# Patient Record
Sex: FEMALE | Race: BLACK OR AFRICAN AMERICAN | NOT HISPANIC OR LATINO | Employment: UNEMPLOYED | URBAN - METROPOLITAN AREA
[De-identification: names, ages, dates, MRNs, and addresses within clinical notes are randomized per-mention and may not be internally consistent; named-entity substitution may affect disease eponyms.]

---
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
## 2023-05-31 ENCOUNTER — APPOINTMENT (OUTPATIENT)
Dept: LAB | Facility: CLINIC | Age: 18
End: 2023-05-31
Payer: MEDICARE

## 2023-05-31 DIAGNOSIS — R76.12 NONSPECIFIC REACTION TO CELL MEDIATED IMMUNITY MEASUREMENT OF GAMMA INTERFERON ANTIGEN RESPONSE WITHOUT ACTIVE TUBERCULOSIS: ICD-10-CM

## 2023-05-31 PROCEDURE — 36415 COLL VENOUS BLD VENIPUNCTURE: CPT

## 2023-05-31 PROCEDURE — 86480 TB TEST CELL IMMUN MEASURE: CPT

## 2023-06-01 LAB
GAMMA INTERFERON BACKGROUND BLD IA-ACNC: 0.03 IU/ML
M TB IFN-G BLD-IMP: NEGATIVE
M TB IFN-G CD4+ BCKGRND COR BLD-ACNC: 0 IU/ML
M TB IFN-G CD4+ BCKGRND COR BLD-ACNC: 0 IU/ML
MITOGEN IGNF BCKGRD COR BLD-ACNC: >10 IU/ML

## 2023-06-21 ENCOUNTER — HOSPITAL ENCOUNTER (EMERGENCY)
Facility: HOSPITAL | Age: 18
Discharge: HOME | End: 2023-06-21
Attending: EMERGENCY MEDICINE

## 2023-06-21 VITALS
TEMPERATURE: 97.4 F | OXYGEN SATURATION: 100 % | RESPIRATION RATE: 18 BRPM | SYSTOLIC BLOOD PRESSURE: 124 MMHG | HEART RATE: 64 BPM | DIASTOLIC BLOOD PRESSURE: 73 MMHG

## 2023-06-21 DIAGNOSIS — J02.9 VIRAL PHARYNGITIS: ICD-10-CM

## 2023-06-21 DIAGNOSIS — R51.9 HEADACHE: Primary | ICD-10-CM

## 2023-06-21 PROCEDURE — 99283 EMERGENCY DEPT VISIT LOW MDM: CPT

## 2023-06-21 RX ORDER — IBUPROFEN 600 MG/1
600 TABLET ORAL ONCE
Status: COMPLETED | OUTPATIENT
Start: 2023-06-21 | End: 2023-06-21

## 2023-06-21 RX ORDER — ACETAMINOPHEN 325 MG/1
650 TABLET ORAL ONCE
Status: COMPLETED | OUTPATIENT
Start: 2023-06-21 | End: 2023-06-21

## 2023-06-21 RX ADMIN — DEXAMETHASONE SODIUM PHOSPHATE 10 MG: 10 INJECTION, SOLUTION INTRAMUSCULAR; INTRAVENOUS at 20:19

## 2023-06-21 RX ADMIN — ACETAMINOPHEN 650 MG: 325 TABLET ORAL at 20:20

## 2023-06-21 RX ADMIN — IBUPROFEN 600 MG: 600 TABLET ORAL at 20:20

## 2023-06-21 NOTE — ED ATTENDING ATTESTATION
Final Diagnoses:   No diagnosis found  Izzy Delgado MD, saw and evaluated the patient  All available labs and X-rays were ordered by me or the resident / non-physician and have been reviewed by myself  I discussed the patient with the resident / non-physician and agree with the resident's / non-physician practitioner's findings and plan as documented in the resident's / non-physician practicitioner's note, except where noted  At this point, I agree with the current assessment done in the ED  I was present during key portions of all procedures performed unless otherwise stated  Nursing Triage:     Chief Complaint   Patient presents with   • Headache     Pt reports a headache that worsens when she swallows, nasal congestion, and pressure in her ear when swallowing  HPI:   This is a 21 y o  female presenting for evaluation of headache   2 days ago she started to have throat pain with swallowing, nasal congestion, RIGHT ear pain and clogging since today  Eating/drinking okay  Hoarse voice today  +headache that is off/on  +cough (yellow/green sputum)  Took advil yesterday, and a cold flu medication this morning  She's been on her own since 13  No covid/flu vaccine  No sick contacts  No fevers, chills  She saw her Dad who had URI symptoms? ??  Doesn't see a dentist      PMH: Asthma    ASSESSMENT + PLAN:   Viral syndrome: Patient's story / exam fits with viral syndrome  - Discussed Tylenol/NSAIDs here + q6h at home  - Decadron for likely viral pharyngtis  - Discussed that it will have to run its course and low yield of antibiotics  - RTER precautions discussed orally  Physical:   General: VS reviewed  Appears in NAD  awake, alert  Well-nourished, well-developed  Appears stated age  Speaking normally in full sentences  Head: Normocephalic, atraumatic  Eyes: EOM-I  No diplopia  No hyphema  No subconjunctival hemorrhages  Symmetrical lids     ENT: Atraumatic external nose and ears  MMM  No Exudates/  No uvula deviation  No malocclusion  No stridor  Normal phonation  No drooling  Normal swallowing  Neck: No JVD  Right submandibualr tenderness  CV: No pallor noted  Lungs:   No tachypnea  No respiratory distress  Abd: soft nt nd no rebound/guarding  MSK:   FROM spontaneously  Skin: Dry, intact  Neuro: Awake, alert, GCS15, CN II-XII grossly intact  Motor grossly intact  Psychiatric/Behavioral: interacting normally; appropriate mood/affect   Exam: deferred    Vitals:    06/21/23 1906   BP: 124/73   BP Location: Right arm   Pulse: 64   Resp: 18   Temp: (!) 97 4 °F (36 3 °C)   TempSrc: Tympanic   SpO2: 100%     - There are no obvious limitations to social determinants of care  - Nursing note reviewed  - Vitals reviewed  - Orders placed by myself and/or advanced practitioner / resident     - Previous chart was reviewed  - No language barrier    - History obtained from patient  - There are no limitations to the history obtained:     Past Medical:    has a past medical history of Asthma and Heart murmur  Past Surgical:    has a past surgical history that includes Shoulder surgery (Right)  Social:     Social History     Substance and Sexual Activity   Alcohol Use Yes    Comment: socially     Social History     Tobacco Use   Smoking Status Every Day   • Packs/day: 0 25   • Types: Cigarettes   Smokeless Tobacco Never     Social History     Substance and Sexual Activity   Drug Use Yes   • Types: Marijuana    Comment: last used 09/10/22       Echo:   No results found for this or any previous visit  No results found for this or any previous visit  Cath:    No results found for this or any previous visit  Code Status: Prior  Advance Directive and Living Will:      Power of :    POLST:    Medications - No data to display  No orders to display     No orders of the defined types were placed in this encounter      Labs Reviewed - No data to "display  ED Disposition     None      Follow-up Information    None       Patient's Medications   Discharge Prescriptions    No medications on file     No discharge procedures on file  Prior to Admission Medications   Prescriptions Last Dose Informant Patient Reported? Taking? albuterol (PROVENTIL HFA,VENTOLIN HFA) 90 mcg/act inhaler   Yes No   Sig: Inhale 2 puffs every 6 (six) hours as needed for wheezing   pantoprazole (PROTONIX) 40 mg tablet   No No   Sig: Take 1 tablet (40 mg total) by mouth every 12 (twelve) hours   sucralfate (CARAFATE) 1 g tablet   No No   Sig: Take 1 tablet (1 g total) by mouth 4 (four) times a day (before meals and at bedtime)      Facility-Administered Medications: None                        Portions of the record may have been created with voice recognition software  Occasional wrong word or \"sound a like\" substitutions may have occurred due to the inherent limitations of voice recognition software  Read the chart carefully and recognize, using context, where substitutions have occurred      Electronically signed by:  Radha Naik    " "every 6 (six) hours as needed for wheezing   pantoprazole (PROTONIX) 40 mg tablet   No No   Sig: Take 1 tablet (40 mg total) by mouth every 12 (twelve) hours   sucralfate (CARAFATE) 1 g tablet   No No   Sig: Take 1 tablet (1 g total) by mouth 4 (four) times a day (before meals and at bedtime)      Facility-Administered Medications: None                        Portions of the record may have been created with voice recognition software  Occasional wrong word or \"sound a like\" substitutions may have occurred due to the inherent limitations of voice recognition software  Read the chart carefully and recognize, using context, where substitutions have occurred      Electronically signed by:  Debra Christianson    "

## 2023-06-21 NOTE — Clinical Note
Moreno Mendiola was seen and treated in our emergency department on 6/21/2023  Diagnosis:     Newton Cushing  may return to work on return date  She may return on this date: 06/24/2023         If you have any questions or concerns, please don't hesitate to call        Hellen Owen DO    ______________________________           _______________          _______________  Hospital Representative                              Date                                Time

## 2023-06-22 NOTE — ED PROVIDER NOTES
History  Chief Complaint   Patient presents with   • Headache     Pt reports a headache that worsens when she swallows, nasal congestion, and pressure in her ear when swallowing  HPI    77-year-old female presenting for evaluation of sore throat, congestion, right ear pain, cough, headache x2 days  Took Advil and cold and flu medication yesterday without relief of her symptoms  Symptoms are worsened when eating, swallowing  Not report difficulty handling secretions  No known sick contacts  No known fevers  Prior to Admission Medications   Prescriptions Last Dose Informant Patient Reported? Taking? albuterol (PROVENTIL HFA,VENTOLIN HFA) 90 mcg/act inhaler   Yes No   Sig: Inhale 2 puffs every 6 (six) hours as needed for wheezing   pantoprazole (PROTONIX) 40 mg tablet   No No   Sig: Take 1 tablet (40 mg total) by mouth every 12 (twelve) hours   sucralfate (CARAFATE) 1 g tablet   No No   Sig: Take 1 tablet (1 g total) by mouth 4 (four) times a day (before meals and at bedtime)      Facility-Administered Medications: None       Past Medical History:   Diagnosis Date   • Asthma    • Heart murmur        Past Surgical History:   Procedure Laterality Date   • SHOULDER SURGERY Right        Family History   Problem Relation Age of Onset   • Heart disease Mother      I have reviewed and agree with the history as documented  E-Cigarette/Vaping   • E-Cigarette Use Never User      E-Cigarette/Vaping Substances   • Nicotine No    • THC No    • CBD No    • Flavoring No    • Other No    • Unknown No      Social History     Tobacco Use   • Smoking status: Every Day     Packs/day: 0 25     Types: Cigarettes   • Smokeless tobacco: Never   Vaping Use   • Vaping Use: Never used   Substance Use Topics   • Alcohol use: Yes     Comment: socially   • Drug use: Yes     Types: Marijuana     Comment: last used 09/10/22        Review of Systems   Constitutional: Negative for chills and fever     HENT: Positive for congestion, ear pain, rhinorrhea and sore throat  Respiratory: Positive for cough  Negative for shortness of breath  Cardiovascular: Negative for chest pain, palpitations and leg swelling  Gastrointestinal: Negative for abdominal pain, diarrhea, nausea and vomiting  Genitourinary: Negative for dysuria and frequency  Musculoskeletal: Negative for myalgias  Skin: Negative for rash and wound  Neurological: Negative for dizziness, light-headedness and headaches  All other systems reviewed and are negative  Physical Exam  ED Triage Vitals [06/21/23 1906]   Temperature Pulse Respirations Blood Pressure SpO2   (!) 97 4 °F (36 3 °C) 64 18 124/73 100 %      Temp Source Heart Rate Source Patient Position - Orthostatic VS BP Location FiO2 (%)   Tympanic Monitor Sitting Right arm --      Pain Score       7             Orthostatic Vital Signs  Vitals:    06/21/23 1906   BP: 124/73   Pulse: 64   Patient Position - Orthostatic VS: Sitting       Physical Exam  Vitals and nursing note reviewed  Constitutional:       General: She is not in acute distress  Appearance: Normal appearance  She is not ill-appearing or toxic-appearing  HENT:      Head: Normocephalic and atraumatic  Right Ear: Tympanic membrane, ear canal and external ear normal       Left Ear: Tympanic membrane, ear canal and external ear normal       Nose: Nose normal       Mouth/Throat:      Mouth: Mucous membranes are moist       Pharynx: Oropharynx is clear  No oropharyngeal exudate or posterior oropharyngeal erythema  Eyes:      General: No scleral icterus  Extraocular Movements: Extraocular movements intact  Cardiovascular:      Rate and Rhythm: Normal rate and regular rhythm  Pulses: Normal pulses  Pulmonary:      Effort: Pulmonary effort is normal  No respiratory distress  Breath sounds: Normal breath sounds  Abdominal:      Palpations: Abdomen is soft  Tenderness: There is no abdominal tenderness     Musculoskeletal: General: Normal range of motion  Cervical back: Normal range of motion and neck supple  Skin:     General: Skin is warm  Capillary Refill: Capillary refill takes less than 2 seconds  Neurological:      General: No focal deficit present  Mental Status: She is alert and oriented to person, place, and time  ED Medications  Medications   acetaminophen (TYLENOL) tablet 650 mg (650 mg Oral Given 6/21/23 2020)   ibuprofen (MOTRIN) tablet 600 mg (600 mg Oral Given 6/21/23 2020)   dexamethasone oral liquid 10 mg 1 mL (10 mg Oral Given 6/21/23 2019)       Diagnostic Studies  Results Reviewed     None                 No orders to display         Procedures  Procedures      ED Course  ED Course as of 06/22/23 1905 Wed Jun 21, 2023 1958 2 days ago, sore throat, congestion, right ear pain  Worse with eating  Cough  SIMPSON  Took advil yesterday and cold/flu medication  No sick contacts  Medical Decision Making  64-year-old female presenting for evaluation of cough, sore throat, congestion, headache, ear pain  Differential diagnosis includes but is not limited to allergies, upper respiratory infection, viral pharyngitis  His exam is reassuring, she is overall well-appearing  Do not suspect PTA, RPA  Plan: Symptomatic treatment with ibuprofen, Tylenol, Decadron  Patient was given anticipatory guidance, advised to follow-up with her primary care physician, given strict return to ED precautions  All questions were answered at this time  I reviewed all testing with the patient: n/a  I gave oral return precautions for what to return for in addition to the written return precautions  The patient (and any family present: n/a) verbalized understanding of the discharge instructions and warnings that would necessitate return to the Emergency Department    I specifically highlighted areas of special concern regarding the written and verbal discharge instructions and return precautions  All questions were answered prior to discharge  Risk  OTC drugs  Prescription drug management  Disposition  Final diagnoses:   Headache   Viral pharyngitis     Time reflects when diagnosis was documented in both MDM as applicable and the Disposition within this note     Time User Action Codes Description Comment    6/21/2023  8:31 PM Jean Pierre Abbot [R51 9] Headache     6/21/2023  8:31 PM Robert Sheikh Mk [J02 9] Viral pharyngitis       ED Disposition     ED Disposition   Discharge    Condition   Stable    Date/Time   Wed Jun 21, 2023  8:31 PM    Comment   Trevor Garcia discharge to home/self care  Follow-up Information     Follow up With Specialties Details Why Contact Info Additional Information    Nancy Amaro   For Emergency Department Follow-up 12 [facility] [address] [address] [facility] Emergency Department Emergency Medicine  If symptoms worsen Daniele 10 [id number] 562 [facility] 64 [facility] Emergency Department, [address], [address]          Discharge Medication List as of 6/21/2023  8:32 PM      CONTINUE these medications which have NOT CHANGED    Details   albuterol (PROVENTIL HFA,VENTOLIN HFA) 90 mcg/act inhaler Inhale 2 puffs every 6 (six) hours as needed for wheezing, Historical Med      pantoprazole (PROTONIX) 40 mg tablet Take 1 tablet (40 mg total) by mouth every 12 (twelve) hours, Starting Wed 9/14/2022, Normal      sucralfate (CARAFATE) 1 g tablet Take 1 tablet (1 g total) by mouth 4 (four) times a day (before meals and at bedtime), Starting Wed 9/14/2022, Normal           No discharge procedures on file  PDMP Review     None           ED Provider  Attending physically available and evaluated Trevor Garcia  LIV managed the patient along with the ED Attending      Electronically Signed by         Christiana Daley,   06/22/23 1905

## 2023-07-19 ENCOUNTER — HOSPITAL ENCOUNTER (EMERGENCY)
Facility: HOSPITAL | Age: 18
Discharge: HOME | End: 2023-07-19
Attending: EMERGENCY MEDICINE | Admitting: EMERGENCY MEDICINE
Payer: COMMERCIAL

## 2023-07-19 VITALS
DIASTOLIC BLOOD PRESSURE: 74 MMHG | SYSTOLIC BLOOD PRESSURE: 105 MMHG | OXYGEN SATURATION: 100 % | HEART RATE: 62 BPM | TEMPERATURE: 97.5 F | RESPIRATION RATE: 16 BRPM

## 2023-07-19 DIAGNOSIS — Z32.02 PREGNANCY EXAMINATION OR TEST, NEGATIVE RESULT: ICD-10-CM

## 2023-07-19 DIAGNOSIS — N92.6 IRREGULAR MENSES: Primary | ICD-10-CM

## 2023-07-19 LAB
B-HCG SERPL-ACNC: <1 MIU/ML (ref 0–5)
BACTERIA UR QL AUTO: ABNORMAL /HPF
BILIRUB UR QL STRIP: NEGATIVE
CLARITY UR: CLEAR
COLOR UR: YELLOW
GLUCOSE UR STRIP-MCNC: NEGATIVE MG/DL
HGB UR QL STRIP.AUTO: NEGATIVE
KETONES UR STRIP-MCNC: NEGATIVE MG/DL
LEUKOCYTE ESTERASE UR QL STRIP: ABNORMAL
NITRITE UR QL STRIP: NEGATIVE
NON-SQ EPI CELLS URNS QL MICRO: ABNORMAL /HPF
PH UR STRIP.AUTO: 7 [PH] (ref 4.5–8)
PROT UR STRIP-MCNC: NEGATIVE MG/DL
RBC #/AREA URNS AUTO: ABNORMAL /HPF
SP GR UR STRIP.AUTO: 1.02 (ref 1–1.03)
UROBILINOGEN UR QL STRIP.AUTO: 0.2 E.U./DL
WBC #/AREA URNS AUTO: ABNORMAL /HPF

## 2023-07-19 PROCEDURE — 36415 COLL VENOUS BLD VENIPUNCTURE: CPT

## 2023-07-19 PROCEDURE — 84702 CHORIONIC GONADOTROPIN TEST: CPT

## 2023-07-19 PROCEDURE — 81001 URINALYSIS AUTO W/SCOPE: CPT

## 2023-07-19 NOTE — ED ATTENDING ATTESTATION
7/19/2023  I, Latoya Means MD, saw and evaluated the patient. I have discussed the patient with the resident/non-physician practitioner and agree with the resident's/non-physician practitioner's findings, Plan of Care, and MDM as documented in the resident's/non-physician practitioner's note, except where noted. All available labs and Radiology studies were reviewed. I was present for key portions of any procedure(s) performed by the resident/non-physician practitioner and I was immediately available to provide assistance. At this point I agree with the current assessment done in the Emergency Department. I have conducted an independent evaluation of this patient a history and physical is as follows:    ED Course         Critical Care Time  Procedures    18 yo female with irregular menses, lmp 6/15, no vaginal bleeding currently. Pt with faint positive urine preg at home yesterday, negative today. No abdominal pain. Vss, afebrile, lungs cta, rrr, abdomen soft nontender. Serum hcg.

## 2023-07-19 NOTE — ED PROVIDER NOTES
History  Chief Complaint   Patient presents with   • Pregnancy Test     Patient requesting urine/blood pregnancy test - took one at home test with "faint positive" and today's test was negative. LMP 6/15/23. HPI  Patient is 25-year-old female presenting for concerns of positive pregnancy test as taken last night. No significant past medical history. Patient does report that she has irregular menses, last menstrual period was Crys 15. Patient also reports that despite having a positive pregnancy test last night she had 2 negative pregnancy test this morning. Patient denies any abdominal pain, vaginal discharge, vaginal bleeding. Patient is on any contraception. Patient states she is trying to get pregnant. Patient does not follow with OB/GYN. Prior to Admission Medications   Prescriptions Last Dose Informant Patient Reported? Taking? albuterol (PROVENTIL HFA,VENTOLIN HFA) 90 mcg/act inhaler   Yes No   Sig: Inhale 2 puffs every 6 (six) hours as needed for wheezing   pantoprazole (PROTONIX) 40 mg tablet   No No   Sig: Take 1 tablet (40 mg total) by mouth every 12 (twelve) hours   sucralfate (CARAFATE) 1 g tablet   No No   Sig: Take 1 tablet (1 g total) by mouth 4 (four) times a day (before meals and at bedtime)      Facility-Administered Medications: None       Past Medical History:   Diagnosis Date   • Asthma    • Heart murmur        Past Surgical History:   Procedure Laterality Date   • SHOULDER SURGERY Right        Family History   Problem Relation Age of Onset   • Heart disease Mother      I have reviewed and agree with the history as documented.     E-Cigarette/Vaping   • E-Cigarette Use Never User      E-Cigarette/Vaping Substances   • Nicotine No    • THC No    • CBD No    • Flavoring No    • Other No    • Unknown No      Social History     Tobacco Use   • Smoking status: Every Day     Packs/day: 0.25     Types: Cigarettes   • Smokeless tobacco: Never   Vaping Use   • Vaping Use: Never used Substance Use Topics   • Alcohol use: Yes     Comment: socially   • Drug use: Yes     Types: Marijuana     Comment: last used 09/10/22        Review of Systems   Constitutional: Negative. Concerns for positive pregnancy test.   HENT: Negative. Eyes: Negative. Respiratory: Negative. Cardiovascular: Negative. Gastrointestinal: Negative. Endocrine: Negative. Genitourinary: Negative. Musculoskeletal: Negative. Skin: Negative. Allergic/Immunologic: Negative. Neurological: Negative. Hematological: Negative. Psychiatric/Behavioral: Negative. Physical Exam  ED Triage Vitals [07/19/23 0857]   Temperature Pulse Respirations Blood Pressure SpO2   97.5 °F (36.4 °C) 62 16 105/74 100 %      Temp Source Heart Rate Source Patient Position - Orthostatic VS BP Location FiO2 (%)   Tympanic Monitor -- -- --      Pain Score       No Pain             Orthostatic Vital Signs  Vitals:    07/19/23 0857   BP: 105/74   Pulse: 62       Physical Exam  Vitals and nursing note reviewed. Constitutional:       Appearance: Normal appearance. She is normal weight. HENT:      Head: Normocephalic and atraumatic. Right Ear: Tympanic membrane, ear canal and external ear normal.      Left Ear: Tympanic membrane, ear canal and external ear normal.      Nose: Nose normal.      Mouth/Throat:      Mouth: Mucous membranes are moist.      Pharynx: Oropharynx is clear. Eyes:      Extraocular Movements: Extraocular movements intact. Conjunctiva/sclera: Conjunctivae normal.      Pupils: Pupils are equal, round, and reactive to light. Cardiovascular:      Rate and Rhythm: Normal rate and regular rhythm. Pulses: Normal pulses. Heart sounds: Normal heart sounds. Pulmonary:      Effort: Pulmonary effort is normal.      Breath sounds: Normal breath sounds. Abdominal:      General: Abdomen is flat. Bowel sounds are normal.      Palpations: Abdomen is soft. Tenderness:  There is no abdominal tenderness. There is no guarding or rebound. Musculoskeletal:         General: Normal range of motion. Cervical back: Normal range of motion and neck supple. Skin:     General: Skin is warm. Capillary Refill: Capillary refill takes less than 2 seconds. Neurological:      General: No focal deficit present. Mental Status: She is alert and oriented to person, place, and time. Psychiatric:         Mood and Affect: Mood normal.         Behavior: Behavior normal.         Thought Content: Thought content normal.         Judgment: Judgment normal.         ED Medications  Medications - No data to display    Diagnostic Studies  Results Reviewed     Procedure Component Value Units Date/Time    Quantitative hCG [[id number]]  (Normal) Collected: 07/19/23 0922    Lab Status: Final result Specimen: Blood from Arm, Left Updated: 07/19/23 0959     HCG, Quant <1 mIU/mL     Narrative:       Expected Ranges:    HCG results between 5 and 25 mIU/mL may be indicative of early pregnancy but should be interpreted in light of the total clinical presentation. HCG can rise to detectable levels in ruthie and post menopausal women (0-11.6 mIU/mL). Approximate               Approximate HCG  Gestation age          Concentration ( mIU/mL)  _____________          ______________________   Arleen Dubs                      HCG values  0.2-1                       5-50  1-2                           2-3                         100-5000  3-4                         500-97601  4-5                         1000-57417  5-6                         [id number]  6-8                         [id number]  8-12                        [id number]      Urine Microscopic [[id number]] Collected: 07/19/23 0903    Lab Status:  In process Specimen: Urine, Other Updated: 07/19/23 0927    Urine Macroscopic, POC [[id number]]  (Abnormal) Collected: 07/19/23 0903    Lab Status: Final result Specimen: Urine Updated: 07/19/23 0905     Color, UA Yellow     Clarity, UA Clear     pH, UA 7.0     Leukocytes, UA Small     Nitrite, UA Negative     Protein, UA Negative mg/dl      Glucose, UA Negative mg/dl      Ketones, UA Negative mg/dl      Urobilinogen, UA 0.2 E.U./dl      Bilirubin, UA Negative     Occult Blood, UA Negative     Specific Gravity, UA 1.025    Narrative:      CLINITEK RESULT                 No orders to display         Procedures  Procedures      ED Course  ED Course as of 07/19/23 1001   Wed Jul 19, 2023   1000 HCG QUANTITATIVE: <1         CRAFFT    Flowsheet Row Most Recent Value   CRAFFT Initial Screen: During the past 12 months, did you:    1. Drink any alcohol (more than a few sips)? Yes Filed at: 07/19/2023 0901   2. Smoke any marijuana or hashish Yes  [Last used marijuana 9/2022] Filed at: 07/19/2023 0901   3. Use anything else to get high? ("anything else" includes illegal drugs, over the counter and prescription drugs, and things that you sniff or 'powers')? No Filed at: 07/19/2023 0901   CRAFFT Full Screen: During the past 12 months:    1. Have you ever ridden in a car driven by someone (including yourself) who was "high" or had been using alcohol or drugs? 0 Filed at: 07/19/2023 0901   2. Do you ever use alcohol or drugs to relax, feel better about yourself, or fit in? 0 Filed at: 07/19/2023 0901   3. Do you ever use alcohol/drugs while you are by yourself, alone? 0 Filed at: 07/19/2023 0901   4. Do you ever forget things you did while using alcohol or drugs? 0 Filed at: 07/19/2023 0901   5. Do your family or friends ever tell you that you should cut down on your drinking or drug use? 0 Filed at: 07/19/2023 0901   6. Have you gotten into trouble while you were using alcohol or drugs?  0 Filed at: 07/19/2023 0901   CRAFFT Score 0 Filed at: 07/19/2023 0901                                    Medical Decision Making  Patient is 52-year-old female presenting for concerns of positive pregnancy test.  DDx: Pregnancy (intrauterine versus ectopic if positive), irregular menses  Based on patient presentation and HPI, will obtain quantitative hCG and if positive will further evaluate with transabdominal ultrasound to evaluate for intrauterine pregnancy. If negative will discharge with OB/GYN follow-up. Regardless will discharge with OB/GYN follow-up given irregular menses. .  -Quantitative hCG negative for pregnancy. Plan for discharge home with referral for OB/GYN for irregular menses. Irregular menses: chronic illness or injury  Amount and/or Complexity of Data Reviewed  Labs: ordered. Decision-making details documented in ED Course. Disposition  Final diagnoses:   Irregular menses   Pregnancy examination or test, negative result     Time reflects when diagnosis was documented in both MDM as applicable and the Disposition within this note     Time User Action Codes Description Comment    7/19/2023  9:38 AM Lyndy Beverage Add [N92.6] Irregular menses     7/19/2023 10:01 AM Lyndy Beverage Add [Z32.02] Pregnancy examination or test, negative result       ED Disposition     ED Disposition   Discharge    Condition   Stable    Date/Time   Wed Jul 19, 2023  9:38 AM    Comment   Heather Tamayo discharge to home/self care.                Follow-up Information     Follow up With Specialties Details Why Contact Info Additional [address] [facility] Emergency Department Emergency Medicine Go to  If symptoms worsen [address] [id number]  [facility] Emergency Department, [address], [place], [place], [place] Leopold Shorts  Go to  If symptoms worsen [facility]  [address] [facility] Obstetrics and Gynecology Schedule an appointment as soon as possible for a visit   [address] [address]  [address] Obstetrics & Gynecology [address] [place], [place], [address] [phone]          Patient's Medications   Discharge Prescriptions    No medications on file         PDMP Review     None           ED Provider  Attending physically available and evaluated Ruben Lobato. I managed the patient along with the ED Attending.     Electronically Signed by         Elton Grove MD  07/19/23 4827 Jana Sandoval MD  07/19/23 5182

## 2024-10-11 ENCOUNTER — TELEPHONE (OUTPATIENT)
Age: 19
End: 2024-10-11

## 2024-10-11 ENCOUNTER — TELEPHONE (OUTPATIENT)
Dept: OBGYN CLINIC | Facility: CLINIC | Age: 19
End: 2024-10-11

## 2024-10-11 DIAGNOSIS — Z3A.35 35 WEEKS GESTATION OF PREGNANCY: Primary | ICD-10-CM

## 2024-10-11 NOTE — TELEPHONE ENCOUNTER
Patient had called back wanting to schedule a new ob visit with [facility]. I was able to pull records from her previous ob [facility] in [place] but was unable to pull records from [facility]. Francesca transferred to office clerical Sherrell who was going to speak with patient.

## 2024-10-11 NOTE — TELEPHONE ENCOUNTER
----- Message from Hardeep Cardona MD sent at 10/11/2024  2:44 PM EDT -----  Regarding: RE: 35 wk transfer patient  Derick Tsai,    She had an MFM detailed US in [facility] so she just needs a follow up growth US with us in the next two weeks.    Thanks,    Landry  ----- Message -----  From: Quin Haas  Sent: 10/11/2024   2:24 PM EDT  To: Hardeep Cardona MD  Subject: 35 wk transfer patient                           Good afternoon Dr. Cardona,    We have a 35 wk transfer of care patient from [place].     Thank you    Quin

## 2024-10-11 NOTE — TELEPHONE ENCOUNTER
Pt called. Pt is New to the office. Pt is currently 35 weeks pregnant and transferring from [facility]. Pt doesn't have an obgyn office in mind yet and is looking for care. Call got disconnected.

## 2024-10-14 ENCOUNTER — ROUTINE PRENATAL (OUTPATIENT)
Dept: OBGYN CLINIC | Facility: CLINIC | Age: 19
End: 2024-10-14
Payer: COMMERCIAL

## 2024-10-14 ENCOUNTER — TELEPHONE (OUTPATIENT)
Age: 19
End: 2024-10-14

## 2024-10-14 VITALS — WEIGHT: 132 LBS | DIASTOLIC BLOOD PRESSURE: 70 MMHG | BODY MASS INDEX: 23.38 KG/M2 | SYSTOLIC BLOOD PRESSURE: 118 MMHG

## 2024-10-14 DIAGNOSIS — O09.93 SUPERVISION OF HIGH RISK PREGNANCY IN THIRD TRIMESTER: ICD-10-CM

## 2024-10-14 DIAGNOSIS — D57.3 HEMOGLOBIN S TRAIT (HCC): ICD-10-CM

## 2024-10-14 DIAGNOSIS — Z3A.36 36 WEEKS GESTATION OF PREGNANCY: ICD-10-CM

## 2024-10-14 DIAGNOSIS — O09.73 ENCOUNTER FOR SUPERVISION OF HIGH RISK PREGNANCY DUE TO SOCIAL PROBLEM IN THIRD TRIMESTER, ANTEPARTUM: Primary | ICD-10-CM

## 2024-10-14 PROCEDURE — 87150 DNA/RNA AMPLIFIED PROBE: CPT | Performed by: STUDENT IN AN ORGANIZED HEALTH CARE EDUCATION/TRAINING PROGRAM

## 2024-10-14 PROCEDURE — 99214 OFFICE O/P EST MOD 30 MIN: CPT | Performed by: STUDENT IN AN ORGANIZED HEALTH CARE EDUCATION/TRAINING PROGRAM

## 2024-10-14 PROCEDURE — 87184 SC STD DISK METHOD PER PLATE: CPT | Performed by: STUDENT IN AN ORGANIZED HEALTH CARE EDUCATION/TRAINING PROGRAM

## 2024-10-14 RX ORDER — VITAMIN A ACETATE, BETA CAROTENE, ASCORBIC ACID, CHOLECALCIFEROL, .ALPHA.-TOCOPHEROL ACETATE, DL-, THIAMINE MONONITRATE, RIBOFLAVIN, NIACINAMIDE, PYRIDOXINE HYDROCHLORIDE, FOLIC ACID, CYANOCOBALAMIN, CALCIUM CARBONATE, FERROUS FUMARATE, ZINC OXIDE, CUPRIC OXIDE 3080; 12; 120; 400; 1; 1.84; 3; 20; 22; 920; 25; 200; 27; 10; 2 [IU]/1; UG/1; MG/1; [IU]/1; MG/1; MG/1; MG/1; MG/1; MG/1; [IU]/1; MG/1; MG/1; MG/1; MG/1; MG/1
1 TABLET, FILM COATED ORAL DAILY
COMMUNITY
Start: 2024-06-13 | End: 2024-10-14 | Stop reason: SDUPTHER

## 2024-10-14 RX ORDER — FERROUS SULFATE 325(65) MG
325 TABLET, DELAYED RELEASE (ENTERIC COATED) ORAL DAILY
COMMUNITY
Start: 2024-07-11 | End: 2024-10-14 | Stop reason: SDUPTHER

## 2024-10-14 RX ORDER — VITAMIN A ACETATE, BETA CAROTENE, ASCORBIC ACID, CHOLECALCIFEROL, .ALPHA.-TOCOPHEROL ACETATE, DL-, THIAMINE MONONITRATE, RIBOFLAVIN, NIACINAMIDE, PYRIDOXINE HYDROCHLORIDE, FOLIC ACID, CYANOCOBALAMIN, CALCIUM CARBONATE, FERROUS FUMARATE, ZINC OXIDE, CUPRIC OXIDE 3080; 12; 120; 400; 1; 1.84; 3; 20; 22; 920; 25; 200; 27; 10; 2 [IU]/1; UG/1; MG/1; [IU]/1; MG/1; MG/1; MG/1; MG/1; MG/1; [IU]/1; MG/1; MG/1; MG/1; MG/1; MG/1
1 TABLET, FILM COATED ORAL DAILY
Qty: 30 TABLET | Refills: 2 | Status: SHIPPED | OUTPATIENT
Start: 2024-10-14 | End: 2024-10-21 | Stop reason: SDUPTHER

## 2024-10-14 RX ORDER — FERROUS SULFATE 325(65) MG
325 TABLET, DELAYED RELEASE (ENTERIC COATED) ORAL DAILY
Qty: 30 TABLET | Refills: 11 | Status: SHIPPED | OUTPATIENT
Start: 2024-10-14 | End: 2024-10-21 | Stop reason: SDUPTHER

## 2024-10-14 NOTE — PROGRESS NOTES
Routine Prenatal Visit  [facility] [address], [place], [place]    Assessment/Plan:  Yury is a 19 y.o. year old  at 35w6d who presents for routine prenatal visit.     1. Encounter for supervision of high risk pregnancy due to social problem in third trimester, antepartum  Assessment & Plan:  - Recently moved from [place]  - Living in a homeless shelter in [place]  2. Supervision of high risk pregnancy in third trimester  3. Hemoglobin S trait (HCC)  4. 36 weeks gestation of pregnancy  -     Strep B DNA probe, amplification  -     ferrous sulfate 325 (65 FE) MG EC tablet; Take 1 tablet (325 mg total) by mouth daily  -     Prenatal Vit-Fe Fumarate-FA (Prenatal Plus) 27-1 MG TABS; Take 1 tablet by mouth daily        Subjective:     CC: Prenatal care    Yury Zaman is a 19 y.o.  female who presents for routine prenatal care at 35w6d.  Pregnancy ROS: Denies leakage of fluid, pelvic pain, or vaginal bleeding.  Reports fetal movement.    The following portions of the patient's history were reviewed and updated as appropriate: allergies, current medications, past family history, past medical history, obstetric history, gynecologic history, past social history, past surgical history and problem list.      Objective:  /70   Wt 59.9 kg (132 lb)   LMP 2024   BMI 23.38 kg/m²   Pregravid Weight/BMI: Pregravid weight not on file (BMI Could not be calculated)  Current Weight: 59.9 kg (132 lb)   Total Weight Gain: Not found.   Pre- Vitals      Flowsheet Row Most Recent Value   Prenatal Assessment    Fetal Heart Rate 160   Movement Present   Prenatal Vitals    Blood Pressure 118/70   Weight - Scale 59.9 kg (132 lb)   Urine Albumin/Glucose    Dilation/Effacement/Station    Vaginal Drainage    Draining Fluid No   Edema    LLE Edema None   RLE Edema None   Facial Edema None             General: Well appearing, no distress  Respiratory: Unlabored  breathing  Cardiovascular: Regular rate.  Abdomen: Soft, gravid, nontender  Fundal Height: Appropriate for gestational age.  Extremities: Warm and well perfused.  Non tender.

## 2024-10-16 LAB — GP B STREP DNA SPEC QL NAA+PROBE: POSITIVE

## 2024-10-16 NOTE — PROGRESS NOTES
OB INTAKE INTERVIEW 2024    Patient is 19 y.o. who presents for OB intake at 36w1d.  She is not accompanied by anyone during this encounter.  The father of her baby is not involved in the pregnancy.      Patient's last menstrual period was 2024.  Ultrasound: Measured 23 weeks 2 days on 24  Estimated Date of Delivery: 24 confirmed by dating ultrasound.    Signs/Symptoms of Pregnancy  Current pregnancy symptoms: none  Constipation no  Headaches no  Cramping/spotting no  PICA cravings no    Diabetes-  Body mass index is 23.28 kg/m².  If patient has 1 or more, please order early 1 hour GTT  History of GDM no  BMI >35 no  History of PCOS or current metformin use no  History of LGA/macrosomic infant (4000g/9lbs) no    If patient has 2 or more, please order early 1 hour GTT  BMI>30 no  AMA no  First degree relative with type 2 diabetes no  History of chronic HTN, hyperlipidemia, elevated A1C no  High risk race (, , ,  or ) YES    Hypertension- if you answer yes to any of the following, please order baseline preeclampsia labs (cbc, comprehensive metabolic panel, urine protein creatinine ratio, uric acid)  History of of chronic HTN no  History of gestational HTN no  History of preeclampsia, eclampsia, or HELLP syndrome no  History of diabetes no  History of lupus, autoimmune disease, kidney disease no    Thyroid- if yes order TSH with reflex T4  History of thyroid disease no    Bleeding Disorder or Hx of DVT-patient or first degree relative with history of. Order the following if not done previously.   (Factor V, antithrombin III, prothrombin gene mutation, protein C and S Ag, lupus anticoagulant, anticardiolipin, beta-2 glycoprotein)   YES - Patient reports mother has h/o DVTs    OB/GYN-  History of abnormal pap smear n/a  Date of last pap smear Not on file  History of HPV n/a  History of Herpes/HSV no  History of other STI  (gonorrhea, chlamydia, trich) no  History of prior  no  History of prior  no  History of  delivery prior to 36 weeks 6 days no  History of blood transfusion no  Ok for blood transfusion yes    Substance screening-   History of tobacco use no  Currently using tobacco no  Currently using alcohol no  Presently using drugs no  Past drug use  YES - h/o THC  IV drug use- no  Partner drug use no  Parent/Family drug use no - mother alcohol use.  Substance Use Screen Level - low risk    MRSA Screening-   Does the pt have a hx of MRSA? no    Immunizations:  Influenza vaccine given this season NO   Discussed Tdap vaccine YES - Declined today. Considering at next visit, VIS provided today.  COVID Vaccine NO     Genetic/[facility]-  Do you or your partner have a history of any of the following in yourselves or first degree relatives?  Cystic fibrosis no  Spinal muscular atrophy no  Hemoglobinopathy/Sickle Cell/Thalassemia no  Fragile X Intellectual Disability no    If no, discuss Carrier Screening being completed once in a lifetime as a standard of care lab test. Place orders for Cystic Fibrosis Gene Test (FSL720) and Spinal Muscular Atrophy DNA ([id number]).  Patient does not desire testing for Cystic Fibrosis and Spinal Muscular Atrophy.  Had extensive panel completed with prior OB.    Appointment for anatomy at [facility] is scheduled for 10/24.    Interview education  [facility] Pregnancy Essentials Book reviewed, discussed and attached to their AVS YES     Prenatal lab work scripts - completed with prior OB  Extra labs ordered: no    Aspirin/Preeclampsia Screen    Risk Level Risk Factor Recommendation   LOW Prior Uncomplicated full-term delivery no No Aspirin recommendation        MODERATE Nulliparity YES Recommend low-dose aspirin if     BMI>30 no 2 or more moderate risk factors    Family History Preeclampsia (mother/sister) no     35yr old or greater no      or Low Socioeconomic YES     IVF Pregnancy  no      Personal History Risks (low birth weight, prior adverse preg outcome, >10yr preg interval) no         HIGH History of Preeclampsia no Recommend low-dose aspirin if     Multifetal gestation no 1 or more high risk factors    Chronic HTN no     Type 1 or 2 Diabetes no     Renal Disease no     Autoimmune Disease  no      Contraindications to ASA therapy:  NSAID/ ASA allergy: no  Nasal polyps: no  Asthma with history of ASA induced bronchospasm: no  Relative contraindications:  History of GI bleed: no  Active peptic ulcer disease: no  Severe hepatic dysfunction: no    Patient currently 36 weeks.     The patient has a history now or in prior pregnancy notable for: none    Details that I feel the provider should be aware of: Yury came in for her OB intake at 36w2d. Patient is a transfer from [place]. All records on file. Patient does not have any current complaints. Safe medications to take during pregnancy and warning signs reviewed. Labs completed. Patient has ultrasound scheduled with [facility] 10/24. Patient is in transitional housing and is looking for apartments and possibly be moving to new home in the next week or two. Currently working with . Patient is very excited and feels well supported. Patient enrolled in cash assistance and will be getting enrolled in WIC soon. Proof of pregnancy letter provided. Patient reports mother has history of DVT's. Discussed with Dr. Francisco and ok to order additional blood work given history. Declined Tdap and Flu shot today. Patient considering getting Tdap next week. VIS provided.    PN1 visit scheduled. The patient was oriented to our practice, the navigator role, reviewed delivering physicians and [facility] for delivery. All questions were answered.    Interviewed by: Anna Villa RN

## 2024-10-17 ENCOUNTER — INITIAL PRENATAL (OUTPATIENT)
Dept: OBGYN CLINIC | Facility: MEDICAL CENTER | Age: 19
End: 2024-10-17
Payer: COMMERCIAL

## 2024-10-17 VITALS
SYSTOLIC BLOOD PRESSURE: 102 MMHG | BODY MASS INDEX: 23.28 KG/M2 | DIASTOLIC BLOOD PRESSURE: 58 MMHG | HEIGHT: 63 IN | WEIGHT: 131.4 LBS

## 2024-10-17 DIAGNOSIS — Z34.93 THIRD TRIMESTER PREGNANCY: Primary | ICD-10-CM

## 2024-10-17 DIAGNOSIS — Z83.2 FAMILY HISTORY OF BLEEDING DISORDER IN MOTHER: ICD-10-CM

## 2024-10-17 PROCEDURE — T1001 NURSING ASSESSMENT/EVALUATN: HCPCS

## 2024-10-20 LAB
GP B STREP DNA SPEC QL NAA+PROBE: ABNORMAL
GP B STREP DNA SPEC QL NAA+PROBE: ABNORMAL

## 2024-10-21 DIAGNOSIS — Z3A.36 36 WEEKS GESTATION OF PREGNANCY: ICD-10-CM

## 2024-10-21 RX ORDER — FERROUS SULFATE 325(65) MG
325 TABLET, DELAYED RELEASE (ENTERIC COATED) ORAL DAILY
Qty: 30 TABLET | Refills: 11 | Status: SHIPPED | OUTPATIENT
Start: 2024-10-21 | End: 2025-10-21

## 2024-10-21 RX ORDER — VITAMIN A ACETATE, BETA CAROTENE, ASCORBIC ACID, CHOLECALCIFEROL, .ALPHA.-TOCOPHEROL ACETATE, DL-, THIAMINE MONONITRATE, RIBOFLAVIN, NIACINAMIDE, PYRIDOXINE HYDROCHLORIDE, FOLIC ACID, CYANOCOBALAMIN, CALCIUM CARBONATE, FERROUS FUMARATE, ZINC OXIDE, CUPRIC OXIDE 3080; 12; 120; 400; 1; 1.84; 3; 20; 22; 920; 25; 200; 27; 10; 2 [IU]/1; UG/1; MG/1; [IU]/1; MG/1; MG/1; MG/1; MG/1; MG/1; [IU]/1; MG/1; MG/1; MG/1; MG/1; MG/1
1 TABLET, FILM COATED ORAL DAILY
Qty: 30 TABLET | Refills: 2 | Status: SHIPPED | OUTPATIENT
Start: 2024-10-21 | End: 2025-07-18

## 2024-10-22 DIAGNOSIS — R12 HEARTBURN DURING PREGNANCY IN THIRD TRIMESTER: ICD-10-CM

## 2024-10-22 DIAGNOSIS — O26.893 HEARTBURN DURING PREGNANCY IN THIRD TRIMESTER: ICD-10-CM

## 2024-10-22 DIAGNOSIS — B37.31 YEAST VAGINITIS: Primary | ICD-10-CM

## 2024-10-22 RX ORDER — MICONAZOLE NITRATE 2 %
1 CREAM WITH APPLICATOR VAGINAL
Qty: 45 G | Refills: 0 | Status: SHIPPED | OUTPATIENT
Start: 2024-10-22

## 2024-10-24 ENCOUNTER — ULTRASOUND (OUTPATIENT)
Dept: PERINATAL CARE | Facility: OTHER | Age: 19
End: 2024-10-24
Payer: COMMERCIAL

## 2024-10-24 VITALS
BODY MASS INDEX: 23.67 KG/M2 | HEART RATE: 94 BPM | SYSTOLIC BLOOD PRESSURE: 120 MMHG | WEIGHT: 133.6 LBS | DIASTOLIC BLOOD PRESSURE: 60 MMHG | HEIGHT: 63 IN

## 2024-10-24 DIAGNOSIS — Z86.79 HX OF CARDIAC MURMUR: ICD-10-CM

## 2024-10-24 DIAGNOSIS — Q24.9 MATERNAL CONGENITAL CARDIAC ANOMALY AFFECTING PREGNANCY, ANTEPARTUM, THIRD TRIMESTER: ICD-10-CM

## 2024-10-24 DIAGNOSIS — Z34.93 THIRD TRIMESTER PREGNANCY: ICD-10-CM

## 2024-10-24 DIAGNOSIS — Z82.49 FAMILY HISTORY OF THROMBOSIS: ICD-10-CM

## 2024-10-24 DIAGNOSIS — O35.EXX0 PYELECTASIS OF FETUS ON PRENATAL ULTRASOUND: Primary | ICD-10-CM

## 2024-10-24 DIAGNOSIS — Z3A.37 37 WEEKS GESTATION OF PREGNANCY: ICD-10-CM

## 2024-10-24 DIAGNOSIS — D57.3 HEMOGLOBIN S TRAIT (HCC): ICD-10-CM

## 2024-10-24 DIAGNOSIS — O99.413 MATERNAL CONGENITAL CARDIAC ANOMALY AFFECTING PREGNANCY, ANTEPARTUM, THIRD TRIMESTER: ICD-10-CM

## 2024-10-24 PROCEDURE — 99244 OFF/OP CNSLTJ NEW/EST MOD 40: CPT | Performed by: OBSTETRICS & GYNECOLOGY

## 2024-10-24 PROCEDURE — 76816 OB US FOLLOW-UP PER FETUS: CPT | Performed by: OBSTETRICS & GYNECOLOGY

## 2024-10-24 NOTE — Clinical Note
Can you have your staff contact the patient and answer questions.  She reports she was supposed to have some UTI cream sent to the pharmacy that she did not receive.  I suspect it was the Monistat cream for vaginitis.  Can you have nursing staff call her and clarify.  Lynn

## 2024-10-24 NOTE — PROGRESS NOTES
Yury Zaman has no complaints today.  She reports regular fetal movements and does not report any problems.  She is here today at 37w2d for an ultrasound for fetal growth.  She had a anatomy scan previously at an outside institution that showed mild pyelectasis.  She is here today with Ms. Jackson her      Problem list:  Sickle cell carrier-the father of the baby is not involved so she is unaware if he is a carrier for sickle cell  History of fetal pyelectasis bilaterally measuring 4.8 on the right and 4.7 on the left noted on her 23-week ultrasound  History of drug dependence in the past but not currently  Late transfer of care patient reports a history of 2 holes in her heart of which 1 closed and 1 at some point in her life measured 25 mm.  She reports no signs or symptoms of shortness of breath, edema.  She can lie flat to sleep.   Patient reports a history of 2 holes in her heart of which 1 closed and 1 was 25 mm in size.  I see no sign of a echo in care everywhere or her local chart    Ultrasound findings:  The ultrasound today shows normal interval fetal growth and fluid.  The pyelectasis is measuring 7.1 mm on the left and the right side normalized to 2.1 mm. There is no evidence for hydronephrosis or dilated ureter.  The fetal bladder appears normal    Pregnancy ultrasound has limitations and is unable to detect all forms of fetal congenital abnormalities.  The inaccuracy in the EFW can be off by 1 lb either way in the third trimester.    Specific counseling was provided on the following problems:  1.  2 - 3% of babies have pyelectasis ( dilation of the renal pelvis) on US which has resulted from a blockage along the urinary tract. Renal pelvis size less then 4 mm in the second trimester and less then 7 mm after 32 weeks is a normal variant as long as there is no calyceal dilation, ureteromegaly, oligohydramnios, and the bladder and renal parenchyma appear normal. It is found more  commonly in males.     No change in delivery timing or mode recommended with pyelectasis or hydronephrosis. She can be delivered locally.  US is recommended for any fetus with pyelectasis at 7 mm or more at 32 weeks. Due to fluid shifts after birth, a follow up  US should be delayed till > 48 hrs from birth in those babies who pyelectasis measure 14.9 mm or less ( mild to moderate pyelectasis).     Frequency of  pathology found is 12% if the pyelectasis is between 7-8.9 mm ( mild pyelectasis).    2.  During her appointment CC appeared very comfortable.  She did not appear short of breath.  My exam showed her heart had a regular rate and I did not appreciate a murmur or any arrhythmia.       Follow up recommended:   A maternal echo was ordered because patient reports a history of 2 septal defects 1 of which was 25 mm in size.  Recommend a  ultrasound of the fetal pyelectasis. I stressed that this will likely be scheduled for her baby to complete as an outpatient.   Send a message to her OB staff as patient is confused about a UTI cream which I suspect is the cream for yeast that they ordered for her.  No further follow-up ultrasounds are recommended at this time.  Her mother had several blood clots but she is not in contact with her mother currently.  A hypercoagulable state panel was ordered for her and she reports she did receive notification from the lab to say that it is prior authorized.  She can have that drawn at a local [facility] lab.   She was started on iron because of maternal anemia.  We discussed that iron absorption is inhibited by taking iron with calcium, milk products, coffee, tea or medication for reflux.  It is probably easiest to take it at bedtime with vitamin C or orange juice.  It can be taken every other day if needed.    Pre visit time reviewing her records   5 minutes  Face to face time 15 minutes  Post visit time on documentation of note,  updating her problem list, adding orders and prescriptions 25 minutes.  Procedures that were completed today were charged separately.   The level of decision making was moderate complexity.    Lynn Daly MD

## 2024-10-25 ENCOUNTER — ROUTINE PRENATAL (OUTPATIENT)
Dept: OBGYN CLINIC | Facility: MEDICAL CENTER | Age: 19
End: 2024-10-25
Payer: COMMERCIAL

## 2024-10-25 VITALS — DIASTOLIC BLOOD PRESSURE: 58 MMHG | WEIGHT: 133.4 LBS | SYSTOLIC BLOOD PRESSURE: 100 MMHG | BODY MASS INDEX: 23.63 KG/M2

## 2024-10-25 DIAGNOSIS — D57.3 HEMOGLOBIN S TRAIT (HCC): ICD-10-CM

## 2024-10-25 DIAGNOSIS — O35.EXX0 PYELECTASIS OF FETUS ON PRENATAL ULTRASOUND: ICD-10-CM

## 2024-10-25 DIAGNOSIS — O09.73 ENCOUNTER FOR SUPERVISION OF HIGH RISK PREGNANCY DUE TO SOCIAL PROBLEM IN THIRD TRIMESTER, ANTEPARTUM: Primary | ICD-10-CM

## 2024-10-25 PROCEDURE — 99212 OFFICE O/P EST SF 10 MIN: CPT | Performed by: STUDENT IN AN ORGANIZED HEALTH CARE EDUCATION/TRAINING PROGRAM

## 2024-10-28 NOTE — PROGRESS NOTES
Routine Prenatal Visit  [facility] [address], [place], [place]    Assessment/Plan:  Yury is a 19 y.o. year old  at 37w6d who presents for routine prenatal visit.     1. Encounter for supervision of high risk pregnancy due to social problem in third trimester, antepartum  2. Pyelectasis of fetus on prenatal ultrasound  3. Hemoglobin S trait (HCC)        Subjective:     CC: Prenatal care    Yury Zaman is a 19 y.o.  female who presents for routine prenatal care at 37w6d.  Pregnancy ROS: Denies leakage of fluid, pelvic pain, or vaginal bleeding.  Reports normal fetal movement.    The following portions of the patient's history were reviewed and updated as appropriate: allergies, current medications, past family history, past medical history, obstetric history, gynecologic history, past social history, past surgical history and problem list.      Objective:  /58   Wt 60.5 kg (133 lb 6.4 oz)   LMP 2024   BMI 23.63 kg/m²   Pregravid Weight/BMI: 42.6 kg (94 lb) (BMI 16.66)  Current Weight: 60.5 kg (133 lb 6.4 oz)   Total Weight Gain: 17.9 kg (39 lb 6.4 oz)   Pre-Olvin Vitals      Flowsheet Row Most Recent Value   Prenatal Assessment    Fetal Heart Rate 142   Movement Present   Prenatal Vitals    Blood Pressure 100/58   Weight - Scale 60.5 kg (133 lb 6.4 oz)   Urine Albumin/Glucose    Dilation/Effacement/Station    Vaginal Drainage    Draining Fluid No   Edema    LLE Edema None   RLE Edema None   Facial Edema None             General: Well appearing, no distress  Respiratory: Unlabored breathing  Cardiovascular: Regular rate.  Abdomen: Soft, gravid, nontender  Fundal Height: Appropriate for gestational age.  Extremities: Warm and well perfused.  Non tender.

## 2024-10-31 ENCOUNTER — ROUTINE PRENATAL (OUTPATIENT)
Dept: OBGYN CLINIC | Facility: MEDICAL CENTER | Age: 19
End: 2024-10-31
Payer: COMMERCIAL

## 2024-10-31 VITALS
SYSTOLIC BLOOD PRESSURE: 110 MMHG | WEIGHT: 135 LBS | HEIGHT: 63 IN | DIASTOLIC BLOOD PRESSURE: 64 MMHG | BODY MASS INDEX: 23.92 KG/M2

## 2024-10-31 DIAGNOSIS — O09.73 ENCOUNTER FOR SUPERVISION OF HIGH RISK PREGNANCY DUE TO SOCIAL PROBLEM IN THIRD TRIMESTER, ANTEPARTUM: Primary | ICD-10-CM

## 2024-10-31 DIAGNOSIS — O35.EXX0 PYELECTASIS OF FETUS ON PRENATAL ULTRASOUND: ICD-10-CM

## 2024-10-31 DIAGNOSIS — Z3A.38 38 WEEKS GESTATION OF PREGNANCY: ICD-10-CM

## 2024-10-31 PROCEDURE — 99214 OFFICE O/P EST MOD 30 MIN: CPT | Performed by: STUDENT IN AN ORGANIZED HEALTH CARE EDUCATION/TRAINING PROGRAM

## 2024-10-31 NOTE — PROGRESS NOTES
"Routine Prenatal Visit  [facility] [address], [place], [place]    Assessment/Plan:  Yury is a 19 y.o. year old  at 38w2d who presents for routine prenatal visit.     1. Encounter for supervision of high risk pregnancy due to social problem in third trimester, antepartum  2. Pyelectasis of fetus on prenatal ultrasound  3. 38 weeks gestation of pregnancy        Subjective:     CC: Prenatal care    Yury Zaman is a 19 y.o.  female who presents for routine prenatal care at 38w2d.  Pregnancy ROS: Denies leakage of fluid, pelvic pain, or vaginal bleeding.  Reports fetal movement.    The following portions of the patient's history were reviewed and updated as appropriate: allergies, current medications, past family history, past medical history, obstetric history, gynecologic history, past social history, past surgical history and problem list.      Objective:  /64   Ht 5' 3\" (1.6 m)   Wt 61.2 kg (135 lb)   LMP 2024   BMI 23.91 kg/m²   Pregravid Weight/BMI: 42.6 kg (94 lb) (BMI 16.66)  Current Weight: 61.2 kg (135 lb)   Total Weight Gain: 18.6 kg (41 lb)   Pre-Olvin Vitals      Flowsheet Row Most Recent Value   Prenatal Assessment    Fetal Heart Rate 150   Movement Present   Prenatal Vitals    Blood Pressure 110/64   Weight - Scale 61.2 kg (135 lb)   Urine Albumin/Glucose    Dilation/Effacement/Station    Cervical Dilation .5   Cervical Effacement 20   Fetal Station -4   Vaginal Drainage    Draining Fluid No   Edema    LLE Edema Trace   RLE Edema Trace   Facial Edema None             General: Well appearing, no distress  Respiratory: Unlabored breathing  Cardiovascular: Regular rate.  Abdomen: Soft, gravid, nontender  Fundal Height: Appropriate for gestational age.  Extremities: Warm and well perfused.  Non tender.  "

## 2024-11-04 ENCOUNTER — ROUTINE PRENATAL (OUTPATIENT)
Dept: OBGYN CLINIC | Facility: MEDICAL CENTER | Age: 19
End: 2024-11-04
Payer: COMMERCIAL

## 2024-11-04 VITALS — SYSTOLIC BLOOD PRESSURE: 106 MMHG | DIASTOLIC BLOOD PRESSURE: 68 MMHG | BODY MASS INDEX: 24.27 KG/M2 | WEIGHT: 137 LBS

## 2024-11-04 DIAGNOSIS — D57.3 HEMOGLOBIN S TRAIT (HCC): ICD-10-CM

## 2024-11-04 DIAGNOSIS — O35.EXX0 PYELECTASIS OF FETUS ON PRENATAL ULTRASOUND: ICD-10-CM

## 2024-11-04 DIAGNOSIS — O09.73 ENCOUNTER FOR SUPERVISION OF HIGH RISK PREGNANCY DUE TO SOCIAL PROBLEM IN THIRD TRIMESTER, ANTEPARTUM: ICD-10-CM

## 2024-11-04 DIAGNOSIS — Z34.93 THIRD TRIMESTER PREGNANCY: Primary | ICD-10-CM

## 2024-11-04 DIAGNOSIS — Z3A.38 38 WEEKS GESTATION OF PREGNANCY: ICD-10-CM

## 2024-11-04 PROCEDURE — 99214 OFFICE O/P EST MOD 30 MIN: CPT | Performed by: OBSTETRICS & GYNECOLOGY

## 2024-11-04 NOTE — PROGRESS NOTES
Assessment  19 y.o.  at 38w6d presenting for routine prenatal visit.     Plan  Diagnoses and all orders for this visit:    Third trimester pregnancy  38 weeks gestation of pregnancy  - Labor precautions  - FKC  - Desires expectant mgmt presently; discussed if undelivered next visit, will discuss IOL past PILY/late term  - Return in 1wk for PN    Encounter for supervision of high risk pregnancy due to social problem in third trimester, antepartum  - Now in housing program, working with     Hemoglobin S trait (HCC)  - Anemia noted  - Ongoing PNV/iron support    Pyelectasis of fetus on prenatal ultrasound  - Follows with MFM    ____________________________________________________________        Subjective    Yury Jose Guadalupe Zaman is a 19 y.o.  at 38w6d who presents for routine prenatal visit. She is without complaint. Generally more uncomfortable and increasing pressure. She denies contractions, loss of fluid, or vaginal bleeding. She feels regular fetal movements.     Pregnancy Problems:  Patient Active Problem List   Diagnosis    Ingestion of corrosive chemical    Hx of cardiac murmur    Asthma    Encounter for supervision of high risk pregnancy due to social problem in third trimester, antepartum    Hemoglobin S trait (HCC)    Pyelectasis of fetus on prenatal ultrasound    Family history of thrombosis         Objective  /68   Wt 62.1 kg (137 lb)   LMP 2024   BMI 24.27 kg/m²     FHT: 132 BPM   Uterine Size: size equals dates   SVE: FT/th/H    Physical Exam:  Physical Exam  Constitutional:       General: She is not in acute distress.     Appearance: Normal appearance. She is well-developed. She is not ill-appearing, toxic-appearing or diaphoretic.   HENT:      Head: Normocephalic and atraumatic.   Eyes:      General: No scleral icterus.        Right eye: No discharge.         Left eye: No discharge.      Conjunctiva/sclera: Conjunctivae normal.   Pulmonary:      Effort:  Pulmonary effort is normal. No accessory muscle usage or respiratory distress.   Abdominal:      General: There is distension (gravid).      Tenderness: There is no abdominal tenderness. There is no guarding or rebound.   Skin:     General: Skin is warm and dry.      Coloration: Skin is not jaundiced.      Findings: No bruising, erythema or rash.   Neurological:      Mental Status: She is alert.   Psychiatric:         Mood and Affect: Mood normal.         Behavior: Behavior normal.         Thought Content: Thought content normal.         Judgment: Judgment normal.

## 2024-11-08 ENCOUNTER — NURSE TRIAGE (OUTPATIENT)
Age: 19
End: 2024-11-08

## 2024-11-08 NOTE — TELEPHONE ENCOUNTER
"Patient is 39w3d calling to report sick symptoms since yesterday. Notes nausea, vomiting sinus congestions and cough. Has not taken any medication. Good fetal movement. Denies vaginal bleeding, LOF, contractions. Advised PCP/UC for sick symptoms.    ESC to on-call Yessica HERNANDEZ for review who agrees with PCP/UC recommendation.    Patient additionally requesting appointment for Monday in [place] be changed to [facility] [place] due to lack of transportation. Reviewed chart. Offered warm transfer to office but patient declines and requests call back if office is able to assist.    CTS spoke with Edwina in office to review and request location change for assistance with transportation via [employer] for patient. Edwina will review and follow up with patient.    Reason for Disposition   Nursing judgment    Answer Assessment - Initial Assessment Questions  1. REASON FOR CALL: \"What is your main concern right now?\"      Nausea, vomiting, cough, congestion,   2. ONSET: \"When did the s/s start?\"      Yesterday  3. SEVERITY: \"How bad is the s/s?\"      Mod  4. FEVER: \"Do you have a fever?\"      Denies  5. OTHER SYMPTOMS: \"Do you have any other new symptoms?\"      Denies  6. INTERVENTIONS AND RESPONSE: \"What have you done so far to try to make this better? What medications have you used?\"      Tea  7. PREGNANCY: \"Is there any chance you are pregnant?\"      39w3d - Denies vaginal bleeding, LOF, Contractions. Good fetal movement.    Protocols used: No Protocol Available - Sick Adult-ADULT-[place]    "

## 2024-11-11 ENCOUNTER — HOSPITAL ENCOUNTER (OUTPATIENT)
Dept: LABOR AND DELIVERY | Facility: HOSPITAL | Age: 19
Discharge: HOME | End: 2024-11-11
Payer: COMMERCIAL

## 2024-11-11 ENCOUNTER — HOSPITAL ENCOUNTER (INPATIENT)
Facility: HOSPITAL | Age: 19
LOS: 6 days | Discharge: HOME | DRG: 540 | End: 2024-11-17
Attending: OBSTETRICS & GYNECOLOGY | Admitting: OBSTETRICS & GYNECOLOGY
Payer: COMMERCIAL

## 2024-11-11 ENCOUNTER — TELEPHONE (OUTPATIENT)
Dept: OBGYN CLINIC | Facility: MEDICAL CENTER | Age: 19
End: 2024-11-11

## 2024-11-11 ENCOUNTER — ROUTINE PRENATAL (OUTPATIENT)
Dept: OBGYN CLINIC | Facility: MEDICAL CENTER | Age: 19
End: 2024-11-11
Payer: COMMERCIAL

## 2024-11-11 VITALS — DIASTOLIC BLOOD PRESSURE: 60 MMHG | BODY MASS INDEX: 24.53 KG/M2 | SYSTOLIC BLOOD PRESSURE: 110 MMHG | WEIGHT: 138.5 LBS

## 2024-11-11 DIAGNOSIS — B95.1 POSITIVE GBS TEST: ICD-10-CM

## 2024-11-11 DIAGNOSIS — Z3A.39 39 WEEKS GESTATION OF PREGNANCY: ICD-10-CM

## 2024-11-11 DIAGNOSIS — Z34.90 ENCOUNTER FOR ELECTIVE INDUCTION OF LABOR: ICD-10-CM

## 2024-11-11 DIAGNOSIS — O09.73 ENCOUNTER FOR SUPERVISION OF HIGH RISK PREGNANCY DUE TO SOCIAL PROBLEM IN THIRD TRIMESTER, ANTEPARTUM: Primary | ICD-10-CM

## 2024-11-11 DIAGNOSIS — Z98.891 S/P PRIMARY LOW TRANSVERSE C-SECTION: ICD-10-CM

## 2024-11-11 LAB
ABO GROUP BLD: NORMAL
BLD GP AB SCN SERPL QL: NEGATIVE
ERYTHROCYTE [DISTWIDTH] IN BLOOD BY AUTOMATED COUNT: 15.9 % (ref 11.6–15.1)
HCT VFR BLD AUTO: 29.5 % (ref 34.8–46.1)
HGB BLD-MCNC: 9.8 G/DL (ref 11.5–15.4)
HOLD SPECIMEN: YES
MCH RBC QN AUTO: 26.8 PG (ref 26.8–34.3)
MCHC RBC AUTO-ENTMCNC: 33.2 G/DL (ref 31.4–37.4)
MCV RBC AUTO: 81 FL (ref 82–98)
PLATELET # BLD AUTO: 246 THOUSANDS/UL (ref 149–390)
PMV BLD AUTO: 10.7 FL (ref 8.9–12.7)
RBC # BLD AUTO: 3.66 MILLION/UL (ref 3.81–5.12)
RH BLD: POSITIVE
SPECIMEN EXPIRATION DATE: NORMAL
WBC # BLD AUTO: 10.18 THOUSAND/UL (ref 4.31–10.16)

## 2024-11-11 PROCEDURE — 99214 OFFICE O/P EST MOD 30 MIN: CPT | Performed by: NURSE PRACTITIONER

## 2024-11-11 PROCEDURE — 86706 HEP B SURFACE ANTIBODY: CPT

## 2024-11-11 PROCEDURE — 3E033VJ INTRODUCTION OF OTHER HORMONE INTO PERIPHERAL VEIN, PERCUTANEOUS APPROACH: ICD-10-PCS | Performed by: OBSTETRICS & GYNECOLOGY

## 2024-11-11 PROCEDURE — 86850 RBC ANTIBODY SCREEN: CPT

## 2024-11-11 PROCEDURE — 86901 BLOOD TYPING SEROLOGIC RH(D): CPT

## 2024-11-11 PROCEDURE — 86780 TREPONEMA PALLIDUM: CPT

## 2024-11-11 PROCEDURE — 0T9B70Z DRAINAGE OF BLADDER WITH DRAINAGE DEVICE, VIA NATURAL OR ARTIFICIAL OPENING: ICD-10-PCS | Performed by: OBSTETRICS & GYNECOLOGY

## 2024-11-11 PROCEDURE — NC001 PR NO CHARGE: Performed by: OBSTETRICS & GYNECOLOGY

## 2024-11-11 PROCEDURE — 4A1HXCZ MONITORING OF PRODUCTS OF CONCEPTION, CARDIAC RATE, EXTERNAL APPROACH: ICD-10-PCS | Performed by: OBSTETRICS & GYNECOLOGY

## 2024-11-11 PROCEDURE — 86900 BLOOD TYPING SEROLOGIC ABO: CPT

## 2024-11-11 PROCEDURE — 85027 COMPLETE CBC AUTOMATED: CPT

## 2024-11-11 RX ORDER — PANTOPRAZOLE SODIUM 40 MG/1
40 TABLET, DELAYED RELEASE ORAL
Status: DISCONTINUED | OUTPATIENT
Start: 2024-11-12 | End: 2024-11-17 | Stop reason: HOSPADM

## 2024-11-11 RX ORDER — BUPIVACAINE HYDROCHLORIDE 2.5 MG/ML
30 INJECTION, SOLUTION EPIDURAL; INFILTRATION; INTRACAUDAL ONCE AS NEEDED
Status: DISCONTINUED | OUTPATIENT
Start: 2024-11-11 | End: 2024-11-13

## 2024-11-11 RX ORDER — ACETAMINOPHEN 325 MG/1
975 TABLET ORAL EVERY 8 HOURS PRN
Status: DISCONTINUED | OUTPATIENT
Start: 2024-11-11 | End: 2024-11-13

## 2024-11-11 RX ORDER — ALBUTEROL SULFATE 90 UG/1
2 INHALANT RESPIRATORY (INHALATION) EVERY 6 HOURS PRN
Status: DISCONTINUED | OUTPATIENT
Start: 2024-11-11 | End: 2024-11-17 | Stop reason: HOSPADM

## 2024-11-11 RX ORDER — CALCIUM CARBONATE 500 MG/1
1000 TABLET, CHEWABLE ORAL 3 TIMES DAILY PRN
Status: DISCONTINUED | OUTPATIENT
Start: 2024-11-11 | End: 2024-11-13 | Stop reason: SDUPTHER

## 2024-11-11 RX ORDER — ALBUTEROL SULFATE 90 UG/1
2 INHALANT RESPIRATORY (INHALATION) EVERY 6 HOURS PRN
Qty: 6.7 G | Refills: 0 | Status: SHIPPED | OUTPATIENT
Start: 2024-11-11 | End: 2024-11-17

## 2024-11-11 RX ORDER — ONDANSETRON 2 MG/ML
4 INJECTION INTRAMUSCULAR; INTRAVENOUS EVERY 6 HOURS PRN
Status: DISCONTINUED | OUTPATIENT
Start: 2024-11-11 | End: 2024-11-13

## 2024-11-11 NOTE — TELEPHONE ENCOUNTER
Reviewed dates with patient and patient requesting to be induced tonight. Patient aware IOL has been scheduled for tonight 11/11 at 9 PM. No further questions at this time.    ----- Message from AMRY Reyes sent at 11/11/2024  8:00 AM EST -----  Procedure to be scheduled (IOL or CS): iol      PILY: Estimated Date of Delivery: 11/12/24     Indication for delivery: Post dates. Due date 11/12/24    Requested date (s) of delivery: ASAP    If requested date is unavailable, is there a date by which the pt must be delivered?    Physician preference: NA    Cervical Exam Not evaluated.    If IOL, anticipated method: cervical ripening agents, pitocin    AM or PM IOL? PM    TY

## 2024-11-11 NOTE — PROGRESS NOTES
Ambulatory Visit  Name: Yury Zaman      : 2005      MRN: [id number]  Encounter Provider: MARY Camara  Encounter Date: 2024   [facility]    Assessment & Plan  Encounter for supervision of high risk pregnancy due to social problem in third trimester, antepartum    Orders:    albuterol (PROVENTIL HFA,VENTOLIN HFA) 90 mcg/act inhaler; Inhale 2 puffs every 6 (six) hours as needed for wheezing    39 weeks gestation of pregnancy  -Continue prenatal vitamin daily  -Continue fetal kick counts daily  -Continue vaginal/perineal massage 1-4 times per week  -GBS positive will need treatment in labor  -Declines influenza vaccine  -Message sent to nurse navigator patient is interested in induction of labor  -Common discomforts of pregnancy and precautions reviewed.  Signs and symptoms to report reviewed.       Positive GBS test  -Will need treatment in labor.  Penicillin allergy       RTO 3 week PP exam   History of Present Illness     Yury Zaman is a 19 y.o. female   Denies loss of fluid, vaginal bleeding and abdominal pain.  Confirms frequent fetal movement.  Doing fetal kick counts.  Tolerating prenatal vitamin well.  Declines influenza vaccine.  Is interested in induction of labor.    History obtained from : patient  Review of Systems   Constitutional:  Negative for chills, fatigue and fever.   Respiratory: Negative.     Cardiovascular: Negative.    Genitourinary: Negative.      Medical History Reviewed by provider this encounter:  Allergies  Meds  Problems           Objective     /60   Wt 62.8 kg (138 lb 8 oz)   LMP 2024   BMI 24.53 kg/m²     Physical Exam  Constitutional:       Appearance: Normal appearance.   Skin:     General: Skin is warm and dry.   Neurological:      General: No focal deficit present.      Mental Status: She is alert and oriented to person, place, and time.

## 2024-11-12 ENCOUNTER — ANESTHESIA EVENT (INPATIENT)
Dept: LABOR AND DELIVERY | Facility: HOSPITAL | Age: 19
End: 2024-11-12
Payer: COMMERCIAL

## 2024-11-12 ENCOUNTER — ANESTHESIA (INPATIENT)
Dept: LABOR AND DELIVERY | Facility: HOSPITAL | Age: 19
End: 2024-11-12
Payer: COMMERCIAL

## 2024-11-12 PROBLEM — Z34.90 ENCOUNTER FOR ELECTIVE INDUCTION OF LABOR: Status: ACTIVE | Noted: 2024-11-12

## 2024-11-12 PROBLEM — Z3A.39 39 WEEKS GESTATION OF PREGNANCY: Status: ACTIVE | Noted: 2024-11-12

## 2024-11-12 PROBLEM — O99.330 TOBACCO USE AFFECTING PREGNANCY, ANTEPARTUM: Status: ACTIVE | Noted: 2024-11-12

## 2024-11-12 PROBLEM — Z91.89 AT RISK FOR SEXUALLY TRANSMITTED DISEASE DUE TO UNPROTECTED SEX: Status: ACTIVE | Noted: 2024-11-12

## 2024-11-12 LAB
ABO GROUP BLD: NORMAL
C TRACH DNA SPEC QL NAA+PROBE: NEGATIVE
HBV SURFACE AB SER-ACNC: 4.34 MIU/ML
HBV SURFACE AG SER QL: NORMAL
HCV AB SER QL: NORMAL
HIV 1+2 AB+HIV1 P24 AG SERPL QL IA: NORMAL
HIV 2 AB SERPL QL IA: NORMAL
HIV1 AB SERPL QL IA: NORMAL
HIV1 P24 AG SERPL QL IA: NORMAL
N GONORRHOEA DNA SPEC QL NAA+PROBE: NEGATIVE
RH BLD: POSITIVE
TREPONEMA PALLIDUM IGG+IGM AB [PRESENCE] IN SERUM OR PLASMA BY IMMUNOASSAY: NORMAL

## 2024-11-12 PROCEDURE — 86803 HEPATITIS C AB TEST: CPT

## 2024-11-12 PROCEDURE — 87591 N.GONORRHOEAE DNA AMP PROB: CPT

## 2024-11-12 PROCEDURE — 87340 HEPATITIS B SURFACE AG IA: CPT

## 2024-11-12 PROCEDURE — 87389 HIV-1 AG W/HIV-1&-2 AB AG IA: CPT

## 2024-11-12 PROCEDURE — 87491 CHLMYD TRACH DNA AMP PROBE: CPT

## 2024-11-12 RX ORDER — SODIUM CHLORIDE, SODIUM LACTATE, POTASSIUM CHLORIDE, CALCIUM CHLORIDE 600; 310; 30; 20 MG/100ML; MG/100ML; MG/100ML; MG/100ML
125 INJECTION, SOLUTION INTRAVENOUS CONTINUOUS
Status: DISCONTINUED | OUTPATIENT
Start: 2024-11-12 | End: 2024-11-13

## 2024-11-12 RX ORDER — OXYTOCIN/RINGER'S LACTATE 30/500 ML
1-30 PLASTIC BAG, INJECTION (ML) INTRAVENOUS
Status: DISCONTINUED | OUTPATIENT
Start: 2024-11-12 | End: 2024-11-13

## 2024-11-12 RX ORDER — BUTORPHANOL TARTRATE 1 MG/ML
1 INJECTION, SOLUTION INTRAMUSCULAR; INTRAVENOUS
Status: DISCONTINUED | OUTPATIENT
Start: 2024-11-12 | End: 2024-11-12

## 2024-11-12 RX ORDER — LIDOCAINE HYDROCHLORIDE AND EPINEPHRINE 15; 5 MG/ML; UG/ML
INJECTION, SOLUTION EPIDURAL
Status: COMPLETED | OUTPATIENT
Start: 2024-11-12 | End: 2024-11-12

## 2024-11-12 RX ORDER — PROMETHAZINE HYDROCHLORIDE 25 MG/ML
25 INJECTION, SOLUTION INTRAMUSCULAR; INTRAVENOUS ONCE
Status: COMPLETED | OUTPATIENT
Start: 2024-11-12 | End: 2024-11-12

## 2024-11-12 RX ORDER — ROPIVACAINE HYDROCHLORIDE 2 MG/ML
INJECTION, SOLUTION EPIDURAL; INFILTRATION; PERINEURAL CONTINUOUS PRN
Status: DISCONTINUED | OUTPATIENT
Start: 2024-11-12 | End: 2024-11-13

## 2024-11-12 RX ORDER — MORPHINE SULFATE 4 MG/ML
4 INJECTION, SOLUTION INTRAMUSCULAR; INTRAVENOUS ONCE
Status: COMPLETED | OUTPATIENT
Start: 2024-11-12 | End: 2024-11-12

## 2024-11-12 RX ADMIN — VANCOMYCIN HYDROCHLORIDE 1250 MG: 1 INJECTION, POWDER, LYOPHILIZED, FOR SOLUTION INTRAVENOUS at 10:23

## 2024-11-12 RX ADMIN — ONDANSETRON 4 MG: 2 INJECTION INTRAMUSCULAR; INTRAVENOUS at 05:36

## 2024-11-12 RX ADMIN — ROPIVACAINE HYDROCHLORIDE 4 ML: 2 INJECTION, SOLUTION EPIDURAL; INFILTRATION at 15:11

## 2024-11-12 RX ADMIN — ROPIVACAINE HYDROCHLORIDE: 2 INJECTION, SOLUTION EPIDURAL; INFILTRATION at 15:14

## 2024-11-12 RX ADMIN — PANTOPRAZOLE SODIUM 40 MG: 40 TABLET, DELAYED RELEASE ORAL at 07:59

## 2024-11-12 RX ADMIN — ROPIVACAINE HYDROCHLORIDE 8 ML/HR: 2 INJECTION, SOLUTION EPIDURAL; INFILTRATION at 15:15

## 2024-11-12 RX ADMIN — VANCOMYCIN HYDROCHLORIDE 1250 MG: 1 INJECTION, POWDER, LYOPHILIZED, FOR SOLUTION INTRAVENOUS at 02:00

## 2024-11-12 RX ADMIN — SODIUM CHLORIDE, SODIUM LACTATE, POTASSIUM CHLORIDE, AND CALCIUM CHLORIDE 125 ML/HR: .6; .31; .03; .02 INJECTION, SOLUTION INTRAVENOUS at 14:50

## 2024-11-12 RX ADMIN — SODIUM CHLORIDE, SODIUM LACTATE, POTASSIUM CHLORIDE, AND CALCIUM CHLORIDE 125 ML/HR: .6; .31; .03; .02 INJECTION, SOLUTION INTRAVENOUS at 00:20

## 2024-11-12 RX ADMIN — MORPHINE SULFATE 4 MG: 4 INJECTION INTRAVENOUS at 05:39

## 2024-11-12 RX ADMIN — ROPIVACAINE HYDROCHLORIDE 4 ML: 2 INJECTION, SOLUTION EPIDURAL; INFILTRATION at 15:08

## 2024-11-12 RX ADMIN — SODIUM CHLORIDE, SODIUM LACTATE, POTASSIUM CHLORIDE, AND CALCIUM CHLORIDE 125 ML/HR: .6; .31; .03; .02 INJECTION, SOLUTION INTRAVENOUS at 16:04

## 2024-11-12 RX ADMIN — SODIUM CHLORIDE, SODIUM LACTATE, POTASSIUM CHLORIDE, AND CALCIUM CHLORIDE 999 ML/HR: .6; .31; .03; .02 INJECTION, SOLUTION INTRAVENOUS at 06:08

## 2024-11-12 RX ADMIN — LIDOCAINE HYDROCHLORIDE AND EPINEPHRINE 3 ML: 15; 5 INJECTION, SOLUTION EPIDURAL at 15:06

## 2024-11-12 RX ADMIN — BUTORPHANOL TARTRATE 1 MG: 1 INJECTION, SOLUTION INTRAMUSCULAR; INTRAVENOUS at 01:48

## 2024-11-12 RX ADMIN — SODIUM CHLORIDE, SODIUM LACTATE, POTASSIUM CHLORIDE, AND CALCIUM CHLORIDE 125 ML/HR: .6; .31; .03; .02 INJECTION, SOLUTION INTRAVENOUS at 10:08

## 2024-11-12 RX ADMIN — VANCOMYCIN HYDROCHLORIDE 1250 MG: 1 INJECTION, POWDER, LYOPHILIZED, FOR SOLUTION INTRAVENOUS at 19:10

## 2024-11-12 RX ADMIN — PROMETHAZINE HYDROCHLORIDE 25 MG: 25 INJECTION INTRAMUSCULAR; INTRAVENOUS at 04:16

## 2024-11-12 RX ADMIN — Medication 2 MILLI-UNITS/MIN: at 08:12

## 2024-11-12 NOTE — OB LABOR/OXYTOCIN SAFETY PROGRESS
Labor Progress Note - Yury Benmilton Zaman 19 y.o. female MRN: [id number]    Unit/Bed#: -01 Encounter: [id number]       Contraction Frequency (minutes): 4-6  Contraction Intensity: Mild  Uterine Activity Characteristics: Regular  Cervical Dilation: 4        Cervical Effacement: 60  Fetal Station: Ballotable  Baseline Rate (FHR): 140 bpm     FHR Category: I               Vital Signs:   Vitals:    11/12/24 0149   BP: 119/77   Pulse: 100   Resp:    Temp:        Notes/comments:   Patient was having intense pain with [place] balloon despite pain medications.  Gentle traction was unable to dislodge the balloon.  Balloon was deflated and removed.  On cervical check patient is 4/60/-4 with a bulging bag.  Plan to start Pitocin.  Patient is feeling much better now.  Reviewed again her analgesic options.  She is unsure if she wants to continue without her epidural.  Encouraged patient to let us know if she desires any further analgesia.    Noa Hagen MD 11/12/2024 6:34 AM

## 2024-11-12 NOTE — ASSESSMENT & PLAN NOTE
Patient reports she had unprotected intercourse approximately 1 week ago and is requesting STD testing to ensure her and her baby are healthy  She denies any signs or symptoms  No lesions or abnormal discharge on admission  Gonorrhea and Chlamydia testing ordered and collected  HIV, hep B, hep C added to admission labs

## 2024-11-12 NOTE — OB LABOR/OXYTOCIN SAFETY PROGRESS
Oxytocin Safety Progress Check Note - Yury Benmilton Zaman 19 y.o. female MRN: [id number]    Unit/Bed#: -01 Encounter: [id number]    Dose (janet-units/min) Oxytocin: 6 janet-units/min  Contraction Frequency (minutes): 3-6  Contraction Intensity: Mild/Moderate  Uterine Activity Characteristics: Irregular  Cervical Dilation: 4        Cervical Effacement: 70  Fetal Station: -2  Baseline Rate (FHR): 130 bpm     FHR Category: cat 1               Vital Signs:   Vitals:    11/12/24 0813   BP: 113/75   Pulse: 82   Resp:    Temp:        Notes/comments:     Sve performed as patient feeling more pain. SVE above. Patient requesting epidural before recheck    Ashley Simpson DO 11/12/2024 11:29 AM

## 2024-11-12 NOTE — OB LABOR/OXYTOCIN SAFETY PROGRESS
Labor Progress Note - Yury Zaman 19 y.o. female MRN: [id number]    Unit/Bed#:  304-01 Encounter: [id number]       Contraction Frequency (minutes): 12-13  Contraction Intensity: Mild  Uterine Activity Characteristics: Irregular  Cervical Dilation: 2        Cervical Effacement: 50  Fetal Station: -3  Baseline Rate (FHR): 135 bpm     FHR Category: I               Vital Signs:   Vitals:    11/12/24 0149   BP: 119/77   Pulse: 100   Resp:    Temp:        Notes/comments:   Patient is in pain with the FB. SVE as above with FB still in place. Unable to dislodge with traction. Will order phenergan for now and consdier stadol again when she is 3 hours from first dose. Patient is unable to tolerate pitocin at this time and she is luis enrique every 2-5 minutes.       Noa Hagen MD 11/12/2024 3:55 AM

## 2024-11-12 NOTE — OB LABOR/OXYTOCIN SAFETY PROGRESS
Oxytocin Safety Progress Check Note - Yury Zaman 19 y.o. female MRN: [id number]    Unit/Bed#: -01 Encounter: [id number]    Dose (janet-units/min) Oxytocin: 14 janet-units/min  Contraction Frequency (minutes): 5-8  Contraction Intensity: Mild/Moderate  Uterine Activity Characteristics: Regular  Cervical Dilation: 4        Cervical Effacement: 70  Fetal Station: -1  Baseline Rate (FHR): 135 bpm     FHR Category: cat 1               Vital Signs:   Vitals:    11/12/24 1517   BP:    Pulse: 68   Resp:    Temp:    SpO2: 100%       Notes/comments:     Sve as above, patient resting comfortably. AROMcl. Continue pitocin titration    Ashley Simpson DO 11/12/2024 3:59 PM

## 2024-11-12 NOTE — ANESTHESIA PROCEDURE NOTES
"Epidural Block    Patient location during procedure: OB/L&D  Start time: 11/12/2024 3:05 PM  Reason for block: at surgeon's request and primary anesthetic  Staffing  Performed by: Lai Garcia DO  Authorized by: Lai Garcia DO    Preanesthetic Checklist  Completed: patient identified, IV checked, risks and benefits discussed, surgical consent, monitors and equipment checked, pre-op evaluation and timeout performed  Epidural  Patient position: sitting  Prep: Betadine  Sedation Level: no sedation  Patient monitoring: frequent blood pressure checks, continuous pulse oximetry and heart rate  Approach: midline  Location: lumbar, L3-4  Injection technique: SANCHEZ saline  Needle  Needle type: Tuohy   Needle gauge: 17 G  Needle insertion depth: 6 cm  Catheter type: multi-orifice  Catheter size: 19 G  Catheter at skin depth: 10 cm  Catheter securement method: stabilization device, clear occlusive dressing and tape  Test dose: negativelidocaine-epinephrine (XYLOCAINE-MPF/EPINEPHRINE) 1.5 %-1:200,000 injection 3 mL - Epidural   3 mL - 11/12/2024 3:06:00 PM  Assessment  Sensory level: T10  Number of attempts: 2negative aspiration for CSF, negative aspiration for heme and no paresthesia on injection  patient tolerated the procedure well with no immediate complications  Additional Notes  Dural puncture epidural technique.  25g 5\" pencan spinal needle.   +CSF.  Spinal needle removed before epidural catheter placed.          "

## 2024-11-12 NOTE — ASSESSMENT & PLAN NOTE
FOB not involved so patient is unaware if he has any type of inherited anemia.  Hgb 9.8 on admission   2 IVs placed

## 2024-11-12 NOTE — ANESTHESIA PREPROCEDURE EVALUATION
Procedure:  LABOR ANALGESIA    Relevant Problems   GYN   (+) 39 weeks gestation of pregnancy   (+) Encounter for supervision of high risk pregnancy due to social problem in third trimester, antepartum      PULMONARY   (+) Asthma      Other   (+) Hx of cardiac murmur        Physical Exam    Airway    Mallampati score: II  TM Distance: >3 FB  Neck ROM: full     Dental   No notable dental hx     Cardiovascular  Rhythm: regular, Rate: normal, Murmur, Cardiovascular exam normal    Pulmonary  Pulmonary exam normal Breath sounds clear to auscultation    Other Findings  post-pubertal.      Anesthesia Plan  ASA Score- 2     Anesthesia Type- epidural with ASA Monitors.         Additional Monitors:     Airway Plan:            Plan Factors-    Chart reviewed.   Existing labs reviewed. Patient summary reviewed.                  Induction-     Postoperative Plan-     Perioperative Resuscitation Plan - Level 1 - Full Code.       Informed Consent- Anesthetic plan and risks discussed with patient.

## 2024-11-12 NOTE — H&P
H & P- Obstetrics   Yury Zaman 19 y.o. female MRN: [id number]  Unit/Bed#:  304-01 Encounter: [id number]    Assessment: 19 y.o.  at 39w6d admitted for eIOL.  SVE: deferred until she eats and settles   FHT: cat I  Clinical EFW: 63% ; Cephalic confirmed by TAUS  GBS status: positive   Postpartum contraception plan: abstinence, declined contraception     Plan:   At risk for sexually transmitted disease due to unprotected sex  Assessment & Plan  Patient reports she had unprotected intercourse approximately 1 week ago and is requesting STD testing to ensure her and her baby are healthy  She denies any signs or symptoms  No lesions or abnormal discharge on admission  Gonorrhea and Chlamydia testing ordered and collected  HIV, hep B, hep C added to admission labs    Tobacco use affecting pregnancy, antepartum  Assessment & Plan  See social history  Not currently using tobacco or nicotine products     39 weeks gestation of pregnancy  Assessment & Plan  Admit to OBN   Clear liquid diet   F/u T&S, CBC, RPR   IVF LR 125cc/hr   Continuous fetal monitoring and tocometry   Analgesia at maternal request   Vertex by TAUS  Induction plan FB with pitocin      Pyelectasis of fetus on prenatal ultrasound  Assessment & Plan  Needs a  ultrasound at least 48 hours after birth    Hemoglobin S trait (HCC)  Assessment & Plan  FOB not involved so patient is unaware if he has any type of inherited anemia.  Hgb 9.8 on admission   2 IVs placed    Asthma  Assessment & Plan  Avoid hemabate  Home medication ordered          Discussed case and plan w/ Dr. Wyatt      Chief Complaint: none    HPI: Yury Zaman is a 19 y.o.  with an PIYL of 2024, by Last Menstrual Period at 39w6d who is being admitted for eIOL. She denies having uterine contractions, has no LOF, and reports no VB. She states she has felt good FM.    Patient Active Problem List   Diagnosis    Ingestion of corrosive chemical    Hx  of cardiac murmur    Asthma    Encounter for supervision of high risk pregnancy due to social problem in third trimester, antepartum    Hemoglobin S trait (HCC)    Pyelectasis of fetus on prenatal ultrasound    Family history of thrombosis    39 weeks gestation of pregnancy    Encounter for elective induction of labor    Tobacco use affecting pregnancy, antepartum    At risk for sexually transmitted disease due to unprotected sex       Baby complications/comments: pyelectasis on prenatal US    Review of Systems   Constitutional:  Negative for chills and fever.   HENT:  Negative for congestion, rhinorrhea, sneezing and sore throat.    Eyes:  Negative for photophobia and visual disturbance.   Respiratory:  Negative for cough and shortness of breath.    Cardiovascular:  Negative for chest pain.   Gastrointestinal:  Negative for diarrhea, nausea and vomiting.   Genitourinary:  Negative for dysuria and frequency.   Neurological:  Negative for dizziness, numbness and headaches.   Psychiatric/Behavioral: Negative.         OB Hx:  OB History    Para Term  AB Living   1             SAB IAB Ectopic Multiple Live Births                  # Outcome Date GA Lbr Parker/2nd Weight Sex Type Anes PTL Lv   1 Current                Past Medical Hx:  Past Medical History:   Diagnosis Date    Asthma     Heart murmur        Past Surgical hx:  Past Surgical History:   Procedure Laterality Date    SHOULDER SURGERY Right        Social Hx:  Alcohol use: denies  Tobacco use: 1-2 cigarettes daily prior to 5th month of pregnancy, has now quit   Other substance use: denies      Allergies   Allergen Reactions    Penicillins          Medications Prior to Admission:     Prenatal Vit-Fe Fumarate-FA (Prenatal Plus) 27-1 MG TABS    albuterol (PROVENTIL HFA,VENTOLIN HFA) 90 mcg/act inhaler    ferrous sulfate 325 (65 FE) MG EC tablet    omeprazole (PriLOSEC) 20 mg delayed release capsule    Objective:  Temp:  [98.4 °F (36.9 °C)] 98.4 °F (36.9  °C)  HR:  [90] 90  BP: (110-113)/(60-73) 113/73  Resp:  [18] 18  Body mass index is 24.45 kg/m².     Physical Exam:  Physical Exam  Constitutional:       General: She is not in acute distress.     Appearance: Normal appearance. She is not ill-appearing.   Genitourinary:      Genitourinary Comments:  SVE as above  No lesions or abnormal discharge on exam   HENT:      Head: Normocephalic and atraumatic.      Mouth/Throat:      Mouth: Mucous membranes are moist. No oral lesions.   Eyes:      General: No scleral icterus.     Extraocular Movements: Extraocular movements intact.   Cardiovascular:      Rate and Rhythm: Normal rate and regular rhythm.      Pulses: Normal pulses.   Pulmonary:      Effort: Pulmonary effort is normal. No respiratory distress.   Abdominal:      Palpations: Abdomen is soft.      Tenderness: There is no abdominal tenderness.   Musculoskeletal:      Right lower leg: No edema.      Left lower leg: No edema.   Neurological:      General: No focal deficit present.      Mental Status: She is alert.   Skin:     General: Skin is warm and dry.   Psychiatric:         Attention and Perception: Attention normal.         Mood and Affect: Mood normal.         Speech: Speech normal.         Behavior: Behavior normal.         Thought Content: Thought content normal.         Judgment: Judgment normal.            FHT:  Baseline Rate (FHR): 130 bpm  Variability: Moderate  Accelerations: 15 x 15 or greater  Decelerations: Early    TOCO:   Contraction Frequency (minutes): 8-10  Contraction Duration (seconds): 120-140  Contraction Intensity: Mild    Lab Results   Component Value Date    WBC 10.18 (H) 11/11/2024    HGB 9.8 (L) 11/11/2024    HCT 29.5 (L) 11/11/2024     11/11/2024     Lab Results   Component Value Date    K 3.9 09/14/2022     09/14/2022    CO2 24 09/14/2022    BUN 5 09/14/2022    CREATININE 0.92 09/14/2022    AST 15 09/12/2022    ALT 7 09/12/2022     Prenatal Labs: Reviewed     Blood type:  O pos  Antibody: neg  GBS: pos  HIV: NR  Rubella: immune  Syphilis IgM/IgG: NR  HBsAg: pending   HCAb: NR  Chlamydia: neg  Gonorrhea: neg  Diabetes 1 hour screen: wnl  3 hour glucose: n/a  Platelets: 246  Hgb: 9.8  >2 Midnights  INPATIENT     Signature/Title: Noa Hagen MD  Date: 11/12/2024  Time: 12:47 AM

## 2024-11-12 NOTE — OB LABOR/OXYTOCIN SAFETY PROGRESS
Oxytocin Safety Progress Check Note - Yury Benmilton Zaman 19 y.o. female MRN: [id number]    Unit/Bed#: -01 Encounter: [id number]    Dose (janet-units/min) Oxytocin: 16 janet-units/min  Contraction Frequency (minutes): 5-8  Contraction Intensity: Mild/Moderate  Uterine Activity Characteristics: Regular  Cervical Dilation: 4        Cervical Effacement: 70  Fetal Station: -1  Baseline Rate (FHR): 135 bpm     FHR Category: I               Vital Signs:   Vitals:    11/12/24 1737   BP: 122/75   Pulse: 85   Resp:    Temp:    SpO2:        Notes/comments:   Patient is comfortable with her epidural at this time.  FHT category 1.  She is luis enrique every 2 to 3 minutes.  SVE deferred at this time.  Plan for next check in 2 to 4 hours or sooner if clinically indicated.    Noa Hagen MD 11/12/2024 6:13 PM

## 2024-11-12 NOTE — ASSESSMENT & PLAN NOTE
Admit to OBGYN   Clear liquid diet   F/u T&S, CBC, RPR   IVF LR 125cc/hr   Continuous fetal monitoring and tocometry   Analgesia at maternal request   Vertex by TAUS  Induction plan FB with pitocin

## 2024-11-12 NOTE — PLAN OF CARE
Problem: Knowledge Deficit  Goal: Verbalizes understanding of labor plan  Description: Assess patient/family/caregiver's baseline knowledge level and ability to understand information.  Provide education via patient/family/caregiver's preferred learning method at appropriate level of understanding.     1. Provide teaching at level of understanding.  2. Provide teaching via preferred learning method(s).  Outcome: Progressing     Problem: Labor & Delivery  Goal: Manages discomfort  Description: Assess and monitor for signs and symptoms of discomfort.  Assess patient's pain level regularly and per hospital policy.  Administer medications as ordered. Support use of nonpharmacological methods to help control pain such as distraction, imagery, relaxation, and application of heat and cold.  Collaborate with interdisciplinary team and patient to determine appropriate pain management plan.    1. Include patient in decisions related to comfort.  2. Offer non-pharmacological pain management interventions.  3. Report ineffective pain management to physician.  Outcome: Progressing  Goal: Patient vital signs are stable  Description: 1. Assess vital signs - vaginal delivery.  Outcome: Progressing     Problem: BIRTH - VAGINAL/ SECTION  Goal: Fetal and maternal status remain reassuring during the birth process  Description: INTERVENTIONS:  - Monitor vital signs  - Monitor fetal heart rate  - Monitor uterine activity  - Monitor labor progression (vaginal delivery)  - DVT prophylaxis  - Antibiotic prophylaxis  Outcome: Progressing  Goal: Emotionally satisfying birthing experience for mother/fetus  Description: Interventions:  - Assess, plan, implement and evaluate the nursing care given to the patient in labor  - Advocate the philosophy that each childbirth experience is a unique experience and support the family's chosen level of involvement and control during the labor process   - Actively participate in both the patient's  and family's teaching of the birth process  - Consider cultural, Bahai and age-specific factors and plan care for the patient in labor  Outcome: Progressing

## 2024-11-12 NOTE — OB LABOR/OXYTOCIN SAFETY PROGRESS
Oxytocin Safety Progress Check Note - Yury Benroxykey Zaman 19 y.o. female MRN: [id number]    Unit/Bed#: -01 Encounter: [id number]    Dose (janet-units/min) Oxytocin: 4 janet-units/min  Contraction Frequency (minutes): 3-6  Contraction Intensity: Mild/Moderate  Uterine Activity Characteristics: Irregular  Cervical Dilation: 4        Cervical Effacement: 60  Fetal Station: Ballotable  Baseline Rate (FHR): 130 bpm     FHR Category: cat 1               Vital Signs:   Vitals:    11/12/24 0813   BP: 113/75   Pulse: 82   Resp:    Temp:        Notes/comments:     Pitocin started 1 hour ago, SVE deferred at this time.  Tracing category 1, patient having rare contractions, continue Pitocin titration    Ashley Simpson DO 11/12/2024 9:04 AM

## 2024-11-12 NOTE — OB LABOR/OXYTOCIN SAFETY PROGRESS
Labor Progress Note - Yury Benmilton Austyn 19 y.o. female MRN: [id number]    Unit/Bed#: -01 Encounter: [id number]       Contraction Frequency (minutes): 4-6  Contraction Intensity: Mild  Uterine Activity Characteristics: Regular  Cervical Dilation: 2        Cervical Effacement: 50  Fetal Station: -3  Baseline Rate (FHR): 140 bpm     FHR Category: 1               Vital Signs:   Vitals:    11/12/24 0149   BP: 119/77   Pulse: 100   Resp:    Temp:        Notes/comments:     Patient requested [place] balloon to be deflated due to pain.  She at some point wants an epidural.  [place] balloon was deflated.  Difficult cervical examination.  Patient would like to use the bathroom and we will examine cervix after.    Alysha Wyatt MD 11/12/2024 6:26 AM

## 2024-11-12 NOTE — OB LABOR/OXYTOCIN SAFETY PROGRESS
Labor Progress Note - Yury Zaman 19 y.o. female MRN: [id number]    Unit/Bed#: -01 Encounter: [id number]       Contraction Frequency (minutes): 8-10  Contraction Intensity: Mild  Uterine Activity Characteristics: Irregular  Cervical Dilation: 1        Cervical Effacement: 50  Fetal Station: -3  Baseline Rate (FHR): 130 bpm     FHR Category: I               Vital Signs:   Vitals:    11/11/24 2145   BP: 113/73   Pulse: 90   Resp: 18   Temp: 98.4 °F (36.9 °C)       Notes/comments:   PROCEDURE:  SAENZ BALLOON PLACEMENT    A 24F saenz with a 30cc balloon was selected. SVE was performed and cervix was located. Saenz was introduced over sterile gloved hands. Balloon advanced through cervix beyond the internal cervical os. A small amount amount of sterile saline solution was instilled in the balloon to confirm placement. Placement was confirmed to be beyond the internal cervical os. A total of 60cc of sterile saline solution was placed into the balloon. Pt tolerated well. Instructions left with RN to place saenz to gentle traction. Notify MD when saenz dislodged.  Will hold off on Cytotec at this time given regular contractions every 5 to 6 minutes and fairly soft and fairly thin cervix.  Will add Pitocin either before balloon comes out or after depending on timing. Discussed with Dr. Edmundo Hagen MD 11/12/2024 12:40 AM

## 2024-11-13 PROBLEM — Z98.891 S/P PRIMARY LOW TRANSVERSE C-SECTION: Status: ACTIVE | Noted: 2024-11-13

## 2024-11-13 LAB
BASE EXCESS BLDCOV CALC-SCNC: -8.2 MMOL/L (ref 1–9)
BASOPHILS # BLD MANUAL: 0 THOUSAND/UL (ref 0–0.1)
BASOPHILS NFR MAR MANUAL: 0 % (ref 0–1)
EOSINOPHIL # BLD MANUAL: 0.45 THOUSAND/UL (ref 0–0.4)
EOSINOPHIL NFR BLD MANUAL: 2 % (ref 0–6)
ERYTHROCYTE [DISTWIDTH] IN BLOOD BY AUTOMATED COUNT: 16 % (ref 11.6–15.1)
HCO3 BLDCOV-SCNC: 18.3 MMOL/L (ref 12.2–28.6)
HCT VFR BLD AUTO: 20.2 % (ref 34.8–46.1)
HGB BLD-MCNC: 6.8 G/DL (ref 11.5–15.4)
LYMPHOCYTES # BLD AUTO: 18 % (ref 14–44)
LYMPHOCYTES # BLD AUTO: 4.09 THOUSAND/UL (ref 0.6–4.47)
MCH RBC QN AUTO: 27.6 PG (ref 26.8–34.3)
MCHC RBC AUTO-ENTMCNC: 33.7 G/DL (ref 31.4–37.4)
MCV RBC AUTO: 82 FL (ref 82–98)
METAMYELOCYTE ABSOLUTE CT: 0.23 THOUSAND/UL (ref 0–0.1)
METAMYELOCYTES NFR BLD MANUAL: 1 % (ref 0–1)
MONOCYTES # BLD AUTO: 0.45 THOUSAND/UL (ref 0–1.22)
MONOCYTES NFR BLD: 2 % (ref 4–12)
NEUTROPHILS # BLD MANUAL: 17.49 THOUSAND/UL (ref 1.85–7.62)
NEUTS BAND NFR BLD MANUAL: 4 % (ref 0–8)
NEUTS SEG NFR BLD AUTO: 73 % (ref 43–75)
OXYHGB MFR BLDCOV: 49.9 %
PCO2 BLDCOV: 40.7 MM HG (ref 27–43)
PH BLDCOV: 7.27 [PH] (ref 7.19–7.49)
PLATELET # BLD AUTO: 167 THOUSANDS/UL (ref 149–390)
PLATELET BLD QL SMEAR: ADEQUATE
PMV BLD AUTO: 10.6 FL (ref 8.9–12.7)
PO2 BLDCOV: 23.7 MM HG (ref 15–45)
RBC # BLD AUTO: 2.46 MILLION/UL (ref 3.81–5.12)
RBC MORPH BLD: NORMAL
SAO2 % BLDCOV: 10.1 ML/DL
WBC # BLD AUTO: 22.71 THOUSAND/UL (ref 4.31–10.16)

## 2024-11-13 PROCEDURE — 85027 COMPLETE CBC AUTOMATED: CPT

## 2024-11-13 PROCEDURE — NC001 PR NO CHARGE: Performed by: OBSTETRICS & GYNECOLOGY

## 2024-11-13 PROCEDURE — 85007 BL SMEAR W/DIFF WBC COUNT: CPT

## 2024-11-13 PROCEDURE — 82805 BLOOD GASES W/O2 SATURATION: CPT | Performed by: OBSTETRICS & GYNECOLOGY

## 2024-11-13 PROCEDURE — 59514 CESAREAN DELIVERY ONLY: CPT | Performed by: OBSTETRICS & GYNECOLOGY

## 2024-11-13 RX ORDER — MORPHINE SULFATE 1 MG/ML
INJECTION, SOLUTION EPIDURAL; INTRATHECAL; INTRAVENOUS AS NEEDED
Status: DISCONTINUED | OUTPATIENT
Start: 2024-11-13 | End: 2024-11-13

## 2024-11-13 RX ORDER — NALBUPHINE HYDROCHLORIDE 10 MG/ML
5 INJECTION INTRAMUSCULAR; INTRAVENOUS; SUBCUTANEOUS
Status: ACTIVE | OUTPATIENT
Start: 2024-11-13 | End: 2024-11-14

## 2024-11-13 RX ORDER — OXYTOCIN/RINGER'S LACTATE 30/500 ML
62.5 PLASTIC BAG, INJECTION (ML) INTRAVENOUS ONCE
Status: COMPLETED | OUTPATIENT
Start: 2024-11-13 | End: 2024-11-13

## 2024-11-13 RX ORDER — ONDANSETRON 2 MG/ML
4 INJECTION INTRAMUSCULAR; INTRAVENOUS EVERY 8 HOURS PRN
Status: DISCONTINUED | OUTPATIENT
Start: 2024-11-13 | End: 2024-11-17 | Stop reason: HOSPADM

## 2024-11-13 RX ORDER — EPHEDRINE SULFATE 50 MG/ML
INJECTION INTRAVENOUS
Status: COMPLETED
Start: 2024-11-13 | End: 2024-11-13

## 2024-11-13 RX ORDER — OXYTOCIN/RINGER'S LACTATE 30/500 ML
PLASTIC BAG, INJECTION (ML) INTRAVENOUS
Status: COMPLETED
Start: 2024-11-13 | End: 2024-11-13

## 2024-11-13 RX ORDER — LIDOCAINE HCL/EPINEPHRINE/PF 2%-1:200K
VIAL (ML) INJECTION AS NEEDED
Status: DISCONTINUED | OUTPATIENT
Start: 2024-11-13 | End: 2024-11-13

## 2024-11-13 RX ORDER — KETAMINE HCL IN NACL, ISO-OSM 100MG/10ML
SYRINGE (ML) INJECTION AS NEEDED
Status: DISCONTINUED | OUTPATIENT
Start: 2024-11-13 | End: 2024-11-13

## 2024-11-13 RX ORDER — METOCLOPRAMIDE HYDROCHLORIDE 5 MG/ML
10 INJECTION INTRAMUSCULAR; INTRAVENOUS EVERY 6 HOURS PRN
Status: DISCONTINUED | OUTPATIENT
Start: 2024-11-13 | End: 2024-11-17 | Stop reason: HOSPADM

## 2024-11-13 RX ORDER — NALOXONE HYDROCHLORIDE 0.4 MG/ML
0.1 INJECTION, SOLUTION INTRAMUSCULAR; INTRAVENOUS; SUBCUTANEOUS
Status: ACTIVE | OUTPATIENT
Start: 2024-11-13 | End: 2024-11-14

## 2024-11-13 RX ORDER — CLINDAMYCIN PHOSPHATE 900 MG/50ML
900 INJECTION, SOLUTION INTRAVENOUS ONCE
Status: DISCONTINUED | OUTPATIENT
Start: 2024-11-13 | End: 2024-11-17 | Stop reason: HOSPADM

## 2024-11-13 RX ORDER — OXYCODONE HYDROCHLORIDE 5 MG/1
5 TABLET ORAL EVERY 4 HOURS PRN
Status: DISCONTINUED | OUTPATIENT
Start: 2024-11-13 | End: 2024-11-14

## 2024-11-13 RX ORDER — POLYETHYLENE GLYCOL 3350 17 G/17G
17 POWDER, FOR SOLUTION ORAL DAILY PRN
Status: DISCONTINUED | OUTPATIENT
Start: 2024-11-13 | End: 2024-11-17 | Stop reason: HOSPADM

## 2024-11-13 RX ORDER — TRANEXAMIC ACID 10 MG/ML
INJECTION, SOLUTION INTRAVENOUS AS NEEDED
Status: DISCONTINUED | OUTPATIENT
Start: 2024-11-13 | End: 2024-11-13

## 2024-11-13 RX ORDER — ONDANSETRON 2 MG/ML
4 INJECTION INTRAMUSCULAR; INTRAVENOUS EVERY 6 HOURS PRN
Status: ACTIVE | OUTPATIENT
Start: 2024-11-13 | End: 2024-11-14

## 2024-11-13 RX ORDER — TRANEXAMIC ACID 100 MG/ML
INJECTION, SOLUTION INTRAVENOUS
Status: DISPENSED
Start: 2024-11-13 | End: 2024-11-13

## 2024-11-13 RX ORDER — IBUPROFEN 600 MG/1
600 TABLET, FILM COATED ORAL EVERY 6 HOURS
Status: DISCONTINUED | OUTPATIENT
Start: 2024-11-15 | End: 2024-11-14

## 2024-11-13 RX ORDER — CITRIC ACID/SODIUM CITRATE 334-500MG
30 SOLUTION, ORAL ORAL ONCE
Status: DISCONTINUED | OUTPATIENT
Start: 2024-11-13 | End: 2024-11-13

## 2024-11-13 RX ORDER — OXYCODONE HYDROCHLORIDE 5 MG/1
10 TABLET ORAL EVERY 4 HOURS PRN
Status: DISCONTINUED | OUTPATIENT
Start: 2024-11-14 | End: 2024-11-17 | Stop reason: HOSPADM

## 2024-11-13 RX ORDER — SIMETHICONE 80 MG
80 TABLET,CHEWABLE ORAL 4 TIMES DAILY PRN
Status: DISCONTINUED | OUTPATIENT
Start: 2024-11-13 | End: 2024-11-17 | Stop reason: HOSPADM

## 2024-11-13 RX ORDER — CALCIUM CARBONATE 500 MG/1
1000 TABLET, CHEWABLE ORAL DAILY PRN
Status: DISCONTINUED | OUTPATIENT
Start: 2024-11-13 | End: 2024-11-17 | Stop reason: HOSPADM

## 2024-11-13 RX ORDER — MIDAZOLAM HYDROCHLORIDE 2 MG/2ML
INJECTION, SOLUTION INTRAMUSCULAR; INTRAVENOUS AS NEEDED
Status: DISCONTINUED | OUTPATIENT
Start: 2024-11-13 | End: 2024-11-13

## 2024-11-13 RX ORDER — OXYCODONE HYDROCHLORIDE 5 MG/1
5 TABLET ORAL EVERY 4 HOURS PRN
Status: DISCONTINUED | OUTPATIENT
Start: 2024-11-14 | End: 2024-11-17 | Stop reason: HOSPADM

## 2024-11-13 RX ORDER — MIDAZOLAM HYDROCHLORIDE 2 MG/2ML
INJECTION, SOLUTION INTRAMUSCULAR; INTRAVENOUS
Status: DISPENSED
Start: 2024-11-13 | End: 2024-11-13

## 2024-11-13 RX ORDER — METHYLERGONOVINE MALEATE 0.2 MG/ML
INJECTION INTRAVENOUS
Status: COMPLETED
Start: 2024-11-13 | End: 2024-11-13

## 2024-11-13 RX ORDER — OXYTOCIN/RINGER'S LACTATE 30/500 ML
PLASTIC BAG, INJECTION (ML) INTRAVENOUS
Status: DISPENSED
Start: 2024-11-13 | End: 2024-11-13

## 2024-11-13 RX ORDER — ACETAMINOPHEN 325 MG/1
650 TABLET ORAL EVERY 6 HOURS SCHEDULED
Status: DISPENSED | OUTPATIENT
Start: 2024-11-13 | End: 2024-11-16

## 2024-11-13 RX ORDER — DIPHENHYDRAMINE HYDROCHLORIDE 50 MG/ML
25 INJECTION INTRAMUSCULAR; INTRAVENOUS EVERY 6 HOURS PRN
Status: DISCONTINUED | OUTPATIENT
Start: 2024-11-13 | End: 2024-11-17 | Stop reason: HOSPADM

## 2024-11-13 RX ORDER — METHYLERGONOVINE MALEATE 0.2 MG/ML
INJECTION INTRAVENOUS AS NEEDED
Status: DISCONTINUED | OUTPATIENT
Start: 2024-11-13 | End: 2024-11-13

## 2024-11-13 RX ORDER — MORPHINE SULFATE 0.5 MG/ML
INJECTION, SOLUTION EPIDURAL; INTRATHECAL; INTRAVENOUS
Status: DISPENSED
Start: 2024-11-13 | End: 2024-11-13

## 2024-11-13 RX ORDER — LIDOCAINE HCL/EPINEPHRINE/PF 2%-1:200K
VIAL (ML) INJECTION
Status: COMPLETED
Start: 2024-11-13 | End: 2024-11-13

## 2024-11-13 RX ORDER — SODIUM CHLORIDE, SODIUM LACTATE, POTASSIUM CHLORIDE, CALCIUM CHLORIDE 600; 310; 30; 20 MG/100ML; MG/100ML; MG/100ML; MG/100ML
125 INJECTION, SOLUTION INTRAVENOUS CONTINUOUS
Status: DISCONTINUED | OUTPATIENT
Start: 2024-11-13 | End: 2024-11-17 | Stop reason: HOSPADM

## 2024-11-13 RX ORDER — KETOROLAC TROMETHAMINE 30 MG/ML
30 INJECTION, SOLUTION INTRAMUSCULAR; INTRAVENOUS EVERY 6 HOURS SCHEDULED
Status: DISCONTINUED | OUTPATIENT
Start: 2024-11-13 | End: 2024-11-14

## 2024-11-13 RX ORDER — BENZOCAINE/MENTHOL 6 MG-10 MG
1 LOZENGE MUCOUS MEMBRANE DAILY PRN
Status: DISCONTINUED | OUTPATIENT
Start: 2024-11-13 | End: 2024-11-17 | Stop reason: HOSPADM

## 2024-11-13 RX ORDER — GENTAMICIN SULFATE 80 MG/50ML
80 INJECTION, SOLUTION INTRAVENOUS ONCE
Status: DISCONTINUED | OUTPATIENT
Start: 2024-11-13 | End: 2024-11-17 | Stop reason: HOSPADM

## 2024-11-13 RX ORDER — EPHEDRINE SULFATE 50 MG/ML
INJECTION INTRAVENOUS AS NEEDED
Status: DISCONTINUED | OUTPATIENT
Start: 2024-11-13 | End: 2024-11-13

## 2024-11-13 RX ORDER — KETAMINE HCL IN NACL, ISO-OSM 100MG/10ML
SYRINGE (ML) INJECTION
Status: COMPLETED
Start: 2024-11-13 | End: 2024-11-13

## 2024-11-13 RX ADMIN — Medication 20 MG: at 10:28

## 2024-11-13 RX ADMIN — ACETAMINOPHEN 650 MG: 325 TABLET, FILM COATED ORAL at 20:41

## 2024-11-13 RX ADMIN — LIDOCAINE HYDROCHLORIDE,EPINEPHRINE BITARTRATE 3 ML: 20; .005 INJECTION, SOLUTION EPIDURAL; INFILTRATION; INTRACAUDAL; PERINEURAL at 10:05

## 2024-11-13 RX ADMIN — SODIUM CHLORIDE, SODIUM LACTATE, POTASSIUM CHLORIDE, AND CALCIUM CHLORIDE 125 ML/HR: .6; .31; .03; .02 INJECTION, SOLUTION INTRAVENOUS at 11:11

## 2024-11-13 RX ADMIN — SODIUM CHLORIDE, SODIUM LACTATE, POTASSIUM CHLORIDE, AND CALCIUM CHLORIDE: .6; .31; .03; .02 INJECTION, SOLUTION INTRAVENOUS at 10:37

## 2024-11-13 RX ADMIN — LIDOCAINE HYDROCHLORIDE,EPINEPHRINE BITARTRATE 5 ML: 20; .005 INJECTION, SOLUTION EPIDURAL; INFILTRATION; INTRACAUDAL; PERINEURAL at 09:38

## 2024-11-13 RX ADMIN — ROPIVACAINE HYDROCHLORIDE: 2 INJECTION, SOLUTION EPIDURAL; INFILTRATION at 00:47

## 2024-11-13 RX ADMIN — SODIUM CHLORIDE, SODIUM LACTATE, POTASSIUM CHLORIDE, AND CALCIUM CHLORIDE 125 ML/HR: .6; .31; .03; .02 INJECTION, SOLUTION INTRAVENOUS at 16:08

## 2024-11-13 RX ADMIN — METHYLERGONOVINE MALEATE 0.2 MG: 0.2 INJECTION, SOLUTION INTRAMUSCULAR; INTRAVENOUS at 10:06

## 2024-11-13 RX ADMIN — OXYTOCIN 250 MILLI-UNITS/MIN: 10 INJECTION INTRAVENOUS at 10:02

## 2024-11-13 RX ADMIN — SODIUM CHLORIDE, SODIUM LACTATE, POTASSIUM CHLORIDE, AND CALCIUM CHLORIDE 125 ML/HR: .6; .31; .03; .02 INJECTION, SOLUTION INTRAVENOUS at 01:35

## 2024-11-13 RX ADMIN — TRANEXAMIC ACID 1000 MG: 10 INJECTION, SOLUTION INTRAVENOUS at 10:22

## 2024-11-13 RX ADMIN — VANCOMYCIN HYDROCHLORIDE 1250 MG: 1 INJECTION, POWDER, LYOPHILIZED, FOR SOLUTION INTRAVENOUS at 03:30

## 2024-11-13 RX ADMIN — LIDOCAINE HYDROCHLORIDE,EPINEPHRINE BITARTRATE 5 ML: 20; .005 INJECTION, SOLUTION EPIDURAL; INFILTRATION; INTRACAUDAL; PERINEURAL at 09:33

## 2024-11-13 RX ADMIN — MORPHINE SULFATE 3 MG: 1 INJECTION, SOLUTION EPIDURAL; INTRATHECAL; INTRAVENOUS at 10:14

## 2024-11-13 RX ADMIN — EPHEDRINE SULFATE 10 MG: 50 INJECTION INTRAVENOUS at 09:51

## 2024-11-13 RX ADMIN — KETOROLAC TROMETHAMINE 30 MG: 30 INJECTION, SOLUTION INTRAMUSCULAR; INTRAVENOUS at 12:33

## 2024-11-13 RX ADMIN — Medication 62.5 MILLI-UNITS/MIN: at 11:26

## 2024-11-13 RX ADMIN — SODIUM CHLORIDE, SODIUM LACTATE, POTASSIUM CHLORIDE, AND CALCIUM CHLORIDE 999 ML/HR: .6; .31; .03; .02 INJECTION, SOLUTION INTRAVENOUS at 09:32

## 2024-11-13 RX ADMIN — LIDOCAINE HYDROCHLORIDE,EPINEPHRINE BITARTRATE 5 ML: 20; .005 INJECTION, SOLUTION EPIDURAL; INFILTRATION; INTRACAUDAL; PERINEURAL at 09:48

## 2024-11-13 RX ADMIN — ACETAMINOPHEN 975 MG: 325 TABLET ORAL at 07:59

## 2024-11-13 RX ADMIN — PANTOPRAZOLE SODIUM 40 MG: 40 TABLET, DELAYED RELEASE ORAL at 07:59

## 2024-11-13 RX ADMIN — KETOROLAC TROMETHAMINE 30 MG: 30 INJECTION, SOLUTION INTRAMUSCULAR; INTRAVENOUS at 17:59

## 2024-11-13 RX ADMIN — ACETAMINOPHEN 650 MG: 325 TABLET, FILM COATED ORAL at 14:02

## 2024-11-13 RX ADMIN — ONDANSETRON 4 MG: 2 INJECTION INTRAMUSCULAR; INTRAVENOUS at 14:59

## 2024-11-13 RX ADMIN — MIDAZOLAM 2 MG: 1 INJECTION INTRAMUSCULAR; INTRAVENOUS at 10:27

## 2024-11-13 NOTE — OB LABOR/OXYTOCIN SAFETY PROGRESS
Oxytocin Safety Progress Check Note - Yury Jose Guadalupe Zaman 19 y.o. female MRN: [id number]    Unit/Bed#: -01 Encounter: [id number]    Dose (janet-units/min) Oxytocin: 16 janet-units/min > decreased to 10   Contraction Frequency (minutes): 2.5-4  Contraction Intensity: Mild/Moderate  Uterine Activity Characteristics: Regular  Cervical Dilation: 5        Cervical Effacement: 70  Fetal Station: -1  Baseline Rate (FHR): 150 bpm     FHR Category: II               Vital Signs:   Vitals:    11/13/24 0121   BP: 115/66   Pulse: 74   Resp:    Temp:    SpO2:        Notes/comments:   Multiple contractions back to back noted with fetal decelerations to the 90s-100s. Decreased pitocin to 10 with improvement in FHT. DVE deferred at this time given recent check and patient's comfort. She is getting shaky. Suspect she may be transitioning in to active labor. Plan for next SVE 2 hrs from last or sooner if clinically indicated.       Noa Hagen MD 11/13/2024 2:02 AM

## 2024-11-13 NOTE — OB LABOR/OXYTOCIN SAFETY PROGRESS
Oxytocin Safety Progress Check Note - Yury Jose Guadalupe Zaman 19 y.o. female MRN: [id number]    Unit/Bed#: -01 Encounter: [id number]    Dose (janet-units/min) Oxytocin: 16 janet-units/min  Contraction Frequency (minutes): 1.5-2  Contraction Intensity: Mild/Moderate  Uterine Activity Characteristics: Regular  Cervical Dilation: 5        Cervical Effacement: 70  Fetal Station: -1  Baseline Rate (FHR): 150 bpm     FHR Category: II               Vital Signs:   Vitals:    11/13/24 0036   BP: 126/84   Pulse: 75   Resp:    Temp: 98.2 °F (36.8 °C)   SpO2:        Notes/comments:   FHT notable for occasional late decelerations.  They improved with repositioning.  SVE unchanged as above but cervix is much softer in stretcher on this exam.  Fetal caput noted to 0 station but school still present at -1.  Will continue to reposition and titrate Pitocin based on fetal tolerance.  Approximately 4 cm well-circumscribed mass appreciated on the patient's right.  Hard to determine if this is vaginal versus adnexal.  Will continue to monitor.    Noa Hagen MD 11/13/2024 1:34 AM

## 2024-11-13 NOTE — UTILIZATION REVIEW
Initial Clinical Review    Admission: Date/Time/Statement:   Admission Orders (From admission, onward)       Ordered        24 2203  Inpatient Admission  Once                          Orders Placed This Encounter   Procedures    Inpatient Admission     Standing Status:   Standing     Number of Occurrences:   1     Level of Care:   Med Surg [16]     Estimated length of stay:   More than 2 Midnights     Certification:   I certify that inpatient services are medically necessary for this patient for a duration of greater than two midnights. See H&P and MD Progress Notes for additional information about the patient's course of treatment.     ED Arrival Information       Patient not seen in ED                       Chief Complaint   Patient presents with    Scheduled Induction       Initial Presentation: 19 y.o. female  at 39w6d admitted for eIOL. She denies having uterine contractions, has no LOF, and reports no VB. She states she has felt good FM. Plan continuous external monitoring, IVF,cytotec ordaz balloon, IV pitocin if needed Epidural and IV MGSO 4 if needed and supportive care.     0040:  Contraction Frequency (minutes): 8-10  Contraction Intensity: Mild  Uterine Activity Characteristics: Irregular  Cervical Dilation: 1  Cervical Effacement: 50  Fetal Station: -3  Baseline Rate (FHR): 130 bpm  FHR Category: I  Ordaz balloon placement     0355:  Contraction Frequency (minutes): 12-13  Contraction Intensity: Mild  Uterine Activity Characteristics: Irregular  Cervical Dilation: 2  Cervical Effacement: 50  Fetal Station: -3  Baseline Rate (FHR): 135 bpm  FHR Category: I  Ordaz balloon still in place     0626:  Contraction Frequency (minutes): 4-6  Contraction Intensity: Mild  Uterine Activity Characteristics: Regular  Cervical Dilation: 2  Cervical Effacement: 50  Fetal Station: -3  Baseline Rate (FHR): 140 bpm  FHR Category: 1  Ordaz balloon deflated due to pain     0904:  Dose  (janet-units/min) Oxytocin: 4 janet-units/min  Contraction Frequency (minutes): 3-6  Contraction Intensity: Mild/Moderate  Uterine Activity Characteristics: Irregular  Cervical Dilation: 4  Cervical Effacement: 60  Fetal Station: Ballotable  Baseline Rate (FHR): 130 bpm  FHR Category: cat 1    11/12 1129:  Dose (janet-units/min) Oxytocin: 6 janet-units/min  Contraction Frequency (minutes): 3-6  Contraction Intensity: Mild/Moderate  Uterine Activity Characteristics: Irregular  Cervical Dilation: 4  Cervical Effacement: 70  Fetal Station: -2  Baseline Rate (FHR): 130 bpm  FHR Category: cat 1    11/12 1518:  Epidural block    11/12 2119:  Dose (janet-units/min) Oxytocin: 16 janet-units/min  Contraction Frequency (minutes): 2-2.5  Contraction Intensity: Mild/Moderate  Uterine Activity Characteristics: Regular  Cervical Dilation: 5  Cervical Effacement: 70  Fetal Station: -1  Baseline Rate (FHR): 160 bpm  FHR Category: I    11/12 0132:  Dose (janet-units/min) Oxytocin: 16 janet-units/min  Contraction Frequency (minutes): 1.5-2  Contraction Intensity: Mild/Moderate  Uterine Activity Characteristics: Regular  Cervical Dilation: 5  Cervical Effacement: 70  Fetal Station: -1  Baseline Rate (FHR): 150 bpm  FHR Category: II    11/13 0922:  Dose (janet-units/min) Oxytocin: 0 janet-units/min  Contraction Frequency (minutes): 2-4  Contraction Intensity: Mild/Moderate  Uterine Activity Characteristics: Regular  Cervical Dilation: 10  Dilation Complete Date: 11/13/24  Dilation Complete Time: 0750  Cervical Effacement: 70  Fetal Station: -1  Baseline Rate (FHR): 155 bpm  FHR Category: 2  The patient is complete and pushing for 1hr with no descent of fetal head at -1 and a caput and with every contraction and pushing fetal bradycardia is noted with return to baseline and moderate variability. Despite stopping pitocin FHR still having recurrent bradycardia after pushing. Patient with temp of 100.1 and now meconium stained fluid.  I  recommend a  section for persistent category II tracing after each pushing and  arrest of descent. Patient agrees with above.      10:01 am C/S for viable male infant 3500 g (7 lbs 11.5 oz) 21.5: Apgars at one min 8 and five min 9.      Scheduled Medications:  tranexamic Acid, , ,   clindamycin, 900 mg, Intravenous, Once  gentamicin, 80 mg, Intravenous, Once  morphine (PF), , ,   oxytocin, , ,   pantoprazole, 40 mg, Oral, Early Morning  sod citrate-citric acid, 30 mL, Oral, Once  vancomycin, 20 mg/kg, Intravenous, Q8H      Continuous IV Infusions:  lactated ringers, 125 mL/hr, Intravenous, Continuous  oxytocin, 1-30 janet-units/min, Intravenous, Titrated  ropivacaine 0.2%, , Epidural, Continuous      PRN Meds:  tranexamic Acid, , ,   acetaminophen, 975 mg, Oral, Q8H PRN  albuterol, 2 puff, Inhalation, Q6H PRN  bupivacaine (PF), 30 mL, Infiltration, Once PRN  calcium carbonate, 1,000 mg, Oral, TID PRN  miSOPROStol, 25 mcg, Vaginal, Q3H PRN  morphine (PF), , ,   ondansetron, 4 mg, Intravenous, Q6H PRN  oxytocin, , ,       ED Triage Vitals   Temperature Pulse Respirations Blood Pressure SpO2 Pain Score   24 2145 24 2145 24 2145 24 2145 24 1500 24   98.4 °F (36.9 °C) 90 18 113/73 99 % No Pain     Weight (last 2 days)       Date/Time Weight    24 62.6 (138)            Vital Signs (last 3 days)       Date/Time Temp Pulse Resp BP SpO2 Patient Position - Orthostatic VS Pain    24 0853 -- 77 -- 149/65 -- -- --    24 0850 99.5 °F (37.5 °C) 76 -- 129/86 98 % -- --    24 0844 -- -- -- 129/86 -- -- --    24 0836 -- 71 -- 129/ -- -- --    24 0759 -- -- -- -- -- -- Med Not Given for Pain - for MAR use only    24 075 100.1 °F (37.8 °C) 89 -- 138/78 -- -- --    24 0700 100 °F (37.8 °C) -- -- -- -- -- --    2452 -- 73 -- 138/87 -- -- --    2437 -- 68 -- 132/83 -- -- --    2421 -- 67 -- 129/88 -- --  --    11/13/24 0607 -- 101 -- 117/78 -- -- --    11/13/24 0551 -- 88 -- 128/72 -- -- --    11/13/24 0536 -- 77 -- 124/69 -- -- --    11/13/24 0452 -- 83 -- 132/67 -- -- --    11/13/24 0436 -- 75 -- 127/70 -- -- --    11/13/24 0423 -- 86 -- 140/86 -- -- --    11/13/24 0406 -- 77 -- 128/77 -- -- --    11/13/24 0352 -- 76 -- 129/82 -- -- --    11/13/24 0339 -- 71 -- 129/83 -- -- --    11/13/24 0321 -- 77 -- 129/72 -- -- --    11/13/24 0307 -- 79 -- 127/83 -- -- --    11/13/24 0300 -- -- -- 135/90 -- -- --    11/13/24 0252 -- 94 -- -- -- -- --    11/13/24 0237 -- 75 -- 129/83 -- -- --    11/13/24 0221 -- 74 -- 132/87 -- -- --    11/13/24 0207 -- 69 -- 130/88 -- -- --    11/13/24 0200 98.4 °F (36.9 °C) -- -- 125/81 -- -- No Pain    11/13/24 0152 -- 77 -- -- -- -- --    11/13/24 0145 -- -- -- 131/83 -- -- --    11/13/24 0137 -- 74 -- 131/83 -- -- --    11/13/24 0121 -- 74 -- 115/66 -- -- --    11/13/24 0107 -- 74 -- 116/70 -- -- --    11/13/24 0052 -- 71 -- 115/68 -- -- --    11/13/24 0036 98.2 °F (36.8 °C) 75 -- 126/84 -- -- --    11/13/24 0021 -- 81 -- 128/79 -- -- --    11/13/24 0007 -- 74 -- 121/76 -- -- --    11/13/24 0000 -- -- -- -- -- -- 3    11/12/24 2352 -- 79 -- 119/79 -- -- --    11/12/24 2338 -- 86 -- 129/69 -- -- --    11/12/24 2330 -- -- -- 110/68 -- -- --    11/12/24 2321 -- 84 -- -- -- -- --    11/12/24 2308 -- 71 -- 121/74 -- -- --    11/12/24 2305 98.8 °F (37.1 °C) -- -- -- -- -- --    11/12/24 2252 -- 71 -- 117/70 -- -- --    11/12/24 2236 -- 82 -- 112/58 -- -- --    11/12/24 2221 -- 71 -- 108/59 -- -- --    11/12/24 2207 -- 73 -- 111/64 -- -- --    11/12/24 2200 -- -- -- -- -- -- No Pain    11/12/24 2152 -- 77 -- 116/72 -- -- --    11/12/24 2137 -- 70 -- 111/67 -- -- --    11/12/24 2121 -- 82 -- 123/82 -- -- --    11/12/24 2107 -- 71 -- 118/75 -- -- --    11/12/24 2100 -- -- -- -- -- -- No Pain    11/12/24 2054 -- 78 -- 130/76 -- -- --    11/12/24 2037 -- 74 -- 121/77 -- -- --    11/12/24 2021 -- 75  -- 115/78 -- -- --    11/12/24 2008 -- 75 -- 116/74 -- -- --    11/12/24 1947 97.9 °F (36.6 °C) -- -- -- -- -- --    11/12/24 1937 -- 63 -- 118/73 -- -- --    11/12/24 1921 -- 85 -- 106/58 -- -- --    11/12/24 1907 -- 77 -- 111/62 -- -- --    11/12/24 1900 -- -- -- -- -- -- No Pain    11/12/24 1851 -- 75 -- 114/59 -- -- --    11/12/24 1837 -- 74 -- 113/61 -- -- --    11/12/24 1823 -- 79 -- 111/68 -- -- --    11/12/24 1807 -- 81 -- 117/72 -- -- --    11/12/24 1753 -- 84 -- 121/67 -- -- --    11/12/24 1737 -- 85 -- 122/75 -- -- --    11/12/24 1723 -- 74 -- 109/68 -- -- --    11/12/24 1707 -- 75 -- 107/74 -- -- --    11/12/24 1705 -- 75 -- 107/74 -- -- --    11/12/24 1652 -- 67 -- 115/72 -- -- --    11/12/24 1636 -- 70 -- 113/78 -- -- --    11/12/24 1621 -- 65 -- 116/73 -- -- --    11/12/24 1604 -- 64 -- 115/77 -- -- --    11/12/24 1601 -- 69 -- 116/80 -- -- --    11/12/24 1558 -- 64 -- 119/76 -- -- --    11/12/24 1555 -- 68 -- 122/77 -- -- --    11/12/24 1552 -- 70 -- 116/74 -- -- --    11/12/24 1549 -- 78 -- 115/71 -- -- --    11/12/24 1546 -- 64 -- 109/70 -- -- --    11/12/24 1543 -- 64 -- 113/72 -- -- --    11/12/24 1542 -- 66 -- -- 100 % -- --    11/12/24 1540 -- 75 -- 113/74 -- -- --    11/12/24 1537 -- 71 -- -- 100 % -- --    11/12/24 1536 -- 70 -- 113/77 -- -- --    11/12/24 1534 -- 68 -- 109/74 -- -- --    11/12/24 1532 -- 71 -- -- 100 % -- --    11/12/24 1531 -- 72 -- 110/70 -- -- --    11/12/24 1528 -- 64 -- 109/68 -- -- --    11/12/24 1527 -- 74 -- -- 100 % -- --    11/12/24 1525 -- 72 -- 111/65 -- -- --    11/12/24 1522 -- 72 -- 114/68 100 % -- --    11/12/24 1519 -- 73 -- 107/69 -- -- --    11/12/24 1517 -- 68 -- -- 100 % -- --    11/12/24 1516 -- 67 -- 110/70 -- -- --    11/12/24 1513 -- 66 -- 116/71 -- -- --    11/12/24 1512 -- 68 -- -- 100 % -- --    11/12/24 1510 -- 75 -- 116/75 -- -- --    11/12/24 1506 -- 77 -- 121/77 -- -- --    11/12/24 1505 -- 81 -- -- 98 % -- --    11/12/24 1500 -- 78 -- -- 99 %  -- --    11/12/24 1443 -- 75 -- 115/73 -- -- --    11/12/24 1430 -- 76 -- 114/76 -- -- --    11/12/24 1413 -- 80 -- 116/62 -- -- --    11/12/24 1358 -- 77 -- 116/62 -- -- --    11/12/24 1348 -- 74 -- 119/63 -- -- --    11/12/24 1328 -- 96 -- 126/74 -- -- --    11/12/24 1313 -- 81 -- 112/68 -- -- --    11/12/24 1301 -- 85 -- 118/71 -- -- --    11/12/24 1300 98.5 °F (36.9 °C) -- -- -- -- -- --    11/12/24 1213 -- 83 -- 106/53 -- -- --    11/12/24 1158 -- 95 -- 123/67 -- -- --    11/12/24 1144 -- 80 -- 123/75 -- -- --    11/12/24 1129 -- 72 -- 119/76 -- -- --    11/12/24 1058 -- 78 -- 121/78 -- -- --    11/12/24 1044 -- 82 -- 125/80 -- -- --    11/12/24 1028 -- 80 -- 118/71 -- -- --    11/12/24 1014 -- 82 -- 122/71 -- -- --    11/12/24 0958 -- 85 -- 125/77 -- -- --    11/12/24 0943 -- 83 -- 119/80 -- -- --    11/12/24 0937 -- 81 -- 132/96 -- -- --    11/12/24 0913 -- 75 -- 105/56 -- -- --    11/12/24 0858 -- 69 -- 105/56 -- -- --    11/12/24 0845 -- 69 -- 108/62 -- -- --    11/12/24 0829 -- 83 -- 127/85 -- -- --    11/12/24 0813 -- 82 -- 113/75 -- -- --    11/12/24 0726 97.9 °F (36.6 °C) 82 18 111/73 -- Sitting --    11/12/24 0539 -- -- -- -- -- -- 8    11/12/24 0149 -- 100 -- 119/77 -- -- --    11/12/24 0148 -- -- -- -- -- -- 7    11/11/24 2145 98.4 °F (36.9 °C) 90 18 113/73 -- Sitting No Pain              Pertinent Labs/Diagnostic Test Results:   Radiology:  No orders to display     Cardiology:  No orders to display     GI:  No orders to display           Results from last 7 days   Lab Units 11/11/24  2235   WBC Thousand/uL 10.18*   HEMOGLOBIN g/dL 9.8*   HEMATOCRIT % 29.5*   PLATELETS Thousands/uL 246               Results from last 7 days   Lab Units 11/12/24  1016   HEP B S AG  Non-reactive   HEP C AB  Non-reactive         Past Medical History:   Diagnosis Date    Asthma     Heart murmur      Present on Admission:   Pyelectasis of fetus on prenatal ultrasound   Hemoglobin S trait (HCC)   Asthma      Admitting  Diagnosis: Encounter for full-term uncomplicated delivery [O80]  Age/Sex: 19 y.o. female    Network Utilization Review Department  ATTENTION: Please call with any questions or concerns to [phone] and carefully listen to the prompts so that you are directed to the right person. All voicemails are confidential.   For Discharge needs, contact Care Management DC Support Team at [phone] opt. 2  Send all requests for admission clinical reviews, approved or denied determinations and any other requests to dedicated fax number below belonging to the [place] where the patient is receiving treatment. List of dedicated fax numbers for the Facilities:  FACILITY NAME UR FAX NUMBER   ADMISSION DENIALS (Administrative/Medical Necessity) [phone]   DISCHARGE SUPPORT TEAM (NETWORK) [phone]   PARENT CHILD HEALTH (Maternity/NICU/Pediatrics) [phone]   [facility] [phone]   [facility] [phone]   [facility] [phone]   [facility] [phone]   [facility] [phone]   [facility] [phone]   [facility] [phone]   [facility] [phone]   [facility] [phone]   [facility] [phone]   [facility] [phone]   [facility] [phone]

## 2024-11-13 NOTE — OB LABOR/OXYTOCIN SAFETY PROGRESS
Oxytocin Safety Progress Check Note - Yruy Benmilton Zaman 19 y.o. female MRN: [id number]    Unit/Bed#: -01 Encounter: [id number]    Dose (janet-units/min) Oxytocin: 16 janet-units/min  Contraction Frequency (minutes): 2-2.5  Contraction Intensity: Mild/Moderate  Uterine Activity Characteristics: Regular  Cervical Dilation: 5        Cervical Effacement: 70  Fetal Station: -1  Baseline Rate (FHR): 160 bpm     FHR Category: I               Vital Signs:   Vitals:    11/12/24 2107   BP: 118/75   Pulse: 71   Resp:    Temp:    SpO2:        Notes/comments:   Patient is feeling well. SVE as above. FHT cat I. Continue pitocin titration.       Noa Hagen MD 11/12/2024 9:19 PM

## 2024-11-13 NOTE — OB LABOR/OXYTOCIN SAFETY PROGRESS
Oxytocin Safety Progress Check Note and Labor Progress Safety Check. - Rolandomichaelkey Zaman 19 y.o. female MRN: [id number]    Unit/Bed#: -01 Encounter: [id number]    Dose (janet-units/min) Oxytocin: 0 janet-units/min  Contraction Frequency (minutes): 2-4  Contraction Intensity: Mild/Moderate  Uterine Activity Characteristics: Regular  Cervical Dilation: 10  Dilation Complete Date: 24  Dilation Complete Time: 0750  Cervical Effacement: 70  Fetal Station: -1  Baseline Rate (FHR): 155 bpm     FHR Category: 2               Vital Signs:   Vitals:    24 0853   BP: 149/65   Pulse: 77   Resp:    Temp:    SpO2:        Notes/comments:   SAFETY HUDDLE:  TRIGGER: Category 2 FHR tracing and arrest of descent     PARTICIPANTS: Team and Patient    REVIEW OF CURRENT PLAN OF CARE AND MANAGEMENT: The patient is complete and pushing for 1hr with no descent of fetal head at -1 and a caput and with every contraction and pushing fetal bradycardia is noted with return to baseline and moderate variability. Despite stopping pitocin FHR still having recurrent bradycardia after pushing. Patient with temp of 100.1 and now meconium stained fluid.  I recommend a  section for persistent category II tracing after each pushing and  arrest of descent. Patient agrees with above.       ALL PARTICIPANTS IN AGREEMENT WITH PLAN OF CARE: Yes        Yardlie Toussaint-Foster, DO 2024 9:22 AM

## 2024-11-13 NOTE — LACTATION NOTE
This note was copied from a baby's chart.  CONSULT - LACTATION  Baby Boy Zaman (Clayneashia) 0 days male MRN: [id number]    [facility] NURSERY Room / Bed: (N)/(N) Encounter: [id number]    Maternal Information     MOTHER:  Yury Zaman  Maternal Age: 19 y.o.  OB History: # 1 - Date: 24, Sex: Male, Weight: 3500 g (7 lb 11.5 oz), GA: 40w1d, Type: , Low Transverse, Apgar1: 8, Apgar5: 9, Living: Living, Birth Comments: None   Previouse breast reduction surgery? No    Lactation history:   Has patient previously breast fed: No   How long had patient previously breast fed:     Previous breast feeding complications:       Past Surgical History:   Procedure Laterality Date    SHOULDER SURGERY Right        Birth information:  YOB: 2026   Time of birth: 10:01 AM   Sex: male   Delivery type: , Low Transverse   Birth Weight: 3500 g (7 lb 11.5 oz)   Percent of Weight Change: 0%     Gestational Age: 40w1d   [unfilled]    Assessment     Breast and nipple assessment: normal assessment     Assessment: normal assessment    Feeding assessment: feeding well  LATCH:  Latch: Grasps breast, tongue down, lips flanged, rhythmic sucking   Audible Swallowing: A few with stimulation   Type of Nipple: Everted (After stimulation)   Comfort (Breast/Nipple): Soft/non-tender   Hold (Positioning): Partial assist, teach one side, mother does other, staff holds   LATCH Score: 8          Feeding recommendations:  breast feed on demand every 2-3 hours, watching for early feeding cues. At least 8-12 feedings in 24 hours.     24 1540   Lactation Consultation   Reason for Consult 20;20 min   Maternal Information   Has mother  before? No   Infant to breast within first hour of birth? No   Breastfeeding Delayed Due to Maternal status   Exclusive Pump and Bottle Feed No   LATCH Documentation   Latch 2   Audible Swallowing 1   Type of  Nipple 2   Comfort (Breast/Nipple) 2   Hold (Positioning) 1   LATCH Score 8   Having latch problems? No   Position(s) Used Football   Breasts/Nipples   Left Breast Soft   Right Breast Soft   Left Nipple Everted   Right Nipple Everted   Intervention Hand expression   Breastfeeding Progress Not yet established;Breastfeeding well   Patient Follow-Up   Lactation Consult Status 2   Follow-Up Type Inpatient;Call as needed   Other OB Lactation Documentation    Additional Problem Noted Supportive family member present. Assisted with latching baby to breast. Education provided.  (RSB and DC books.)     Patient states initial latch after delivery went well and that baby breast fed with no difficulties. Assisted with baby's second feed since birth. Demonstrated proper latch and alignment with patient. Patient attentive, willing to learn and demonstrates good technique.Patient states that she feels a strong but comfortable tugging sensation when baby is sucking.     Patient is encouraged to breastfeed on demand: when baby shows hunger cues or least every 2-3 hours. Not waiting over 3 hours to feed the baby. Reviewed that baby should breastfeed at least 8-12 times in 24 hours and watch for early hunger cues. Encouraged patient to offer both breasts during a feed, multiple times. Discussed not limiting baby's time at the breast, as long as baby is actively sucking.    Discussed different waking techniques to help a sleepy baby. Placing baby skin to skin prior to feedings. Stimulating baby's fingers, toes, ears, and chin while they are feeding on the breast to keep them actively sucking. Switching breasts and positions to extend baby's feed and keep baby stimulated. Hand expressing drops of colostrum to help baby wake.    Demonstrated hand expression with patient, with permission. Discussed benefits of colostrum to patient.  Discussed that colostrum is thick and sticky and comes in small amounts in the first few days. Reviewed  that breast milk will start to transition day 3-5.    Discussed how to know when the baby is getting full at the breast.     Encouraged patient to support the base of the baby's neck, avoiding holding the back of the baby's head to allow the baby to move as they need to. Encouraged patient to gently compress the breast as if offering a sandwich with fingers and thumb in parallel with baby's lips. Aligning baby's nose to the nipple and allowing for the baby to open wide, with the baby's chin touching the breast first. Making sure that the nipple is deep in the baby's mouth. Baby's cheeks and chin are touching mother's breast, not seeing the nipple bopping in and out of baby's mouth.    Discussed that mother should feel a strong pull and tug and not feeling any pinching. Discussed that if it feels like baby is pinching at the breast, encourage to break the suction by putting pinky in the corner of the baby's mouth and relatching the baby.    Discussed how to know the baby is feeding adequately at the breast:  -Baby is deeply latched onto the breast, with lips flanged out. Actively sucking, swallows may not be audible.  -Mother feels a comfortable tugging sensation during a feeding.  -Baby is showing fullness cues- content, arms relaxed and/or sleeping.  -Baby is having adequate amounts of diapers and meeting goal diapers.  -Baby's stool is changing from black meconium, to greenish brown to yellow and seedy looking over the first week when exclusively providing breast milk.   -Baby's weight loss is within normal ranges.     Discussed use of feeding log with patient. Patient was encouraged to document baby's feedings and outputs to ensure baby is adequately feeding at the breast.     Reviewed appropriate measures and timeline of introducing use of breast pump.    Placed CM consult for a breast pump.    Patient was encouraged to contact lactation for a latch assessment, continued support and/or questions that arise.      Kiki Raman 11/13/2024 4:13 PM   Patient is asymptomatic

## 2024-11-13 NOTE — L&D DELIVERY NOTE
Brief History  Yury Zaman is a 19 y.o.  at 40w1d admitted for eIOL. A ordaz balloon was placed.  Ordaz balloon fell out and pitocin started along with an epidural . Patient progressed to complete and pushing. During second stage of labor with no descent of caput from -1 station and fetal bradycardia after pushing. The pitocin was stopped and fetal bradycardia persist after every push.  Decision was made to proceed with primary  section for arrest of descent and Cat 2 tracing.    Operative Indications  Primary low transverse  section for Cat 2 tracing and arrest of descent    Attending Surgeon: Dr. Toussaint-Ambrosio  Assistent Surgeon: Dr. Nichols    Operative Findings:  1. Viable male  on 2024 at 1001 with APGARs of 8 and 9 at 1 and 5 minutes. Fetus weighted 7lb 11.5oz.  2. Meconium stained amniotic fluid  3. Normal intact placenta with centrally inserted 3VC.  4. Normal uterus, bilateral tubes and ovaries  5. Filmy adhesions present on the posterior surface of the uterus, the right fallopian tube and ovary     QBL: 1367    Umbilical Cord Venous Blood Gas:  Results from last 7 days   Lab Units 24  0924   PH COV  7.270   PCO2 COV mm HG 40.7   HCO3 COV mmol/L 18.3   BASE EXC COV mmol/L -8.2*   O2 CT CD VB mL/dL 10.1   O2 HGB, VENOUS CORD % 49.9     Umbilical Cord Arterial Blood Gas:        Operative Technique  The patient was taken to the operating room. Epidural  anesthesia was adequately established and Gentamicin and Clindamycin was given for preoperative prophylaxis. The patient was then placed in the dorsal supine position with a left tilt of the hips. The patient was then prepped with chlorhexidine for vaginal prep and chloraprep for abdominal prep.  A fetal pillow was placed in the vagina and draped in the usual sterile fashion for a Pfannenstiel skin incision.      A time out was performed to confirm correct patient and correct procedure.       A  Pfannenstiel incision was made and carried down through the underlying subcutaneous tissue to the fascia using a scalpel. Rectus fascia was then incised. We then proceeded in Yoni-Jaimes fashion. All anatomic layers were well-demarcated. The rectus muscles were  and the peritoneum was identified, entered, and extended longitudinally with blunt dissection.     The Jf-O retractor was inserted and a transverse incision was made in the lower uterine segment using a new surgical blade. The uterine incision was extended cephalad and caudal using blunt dissection. The amniotic sac was entered.    The surgeon's hand was placed into the uterine cavity. The fetal head was identified and elevated through the uterine incision with the assistance of fundal pressure. With gentle traction, the shoulder was delivered, followed by the rest of the fetal body. There was no nuchal cord noted. On delivery the cord was doubly clamped and cut after delayed cord clamping. The infant was then passed off the table to the awaiting  staff. The  was noted to cry spontaneously and moved all extremities. Venous and arterial blood gas, cord blood, and portion of cord was obtained for analysis and routine blood testing. The placenta delivery was then sent to storage. Placenta was noted to be intact with a centrally inserted three-vessel cord.  Oxytocin was administered by IV infusion to enhance uterine contraction. The uterus was exteriorized and cleared of all clots and remaining products of conception.  The uterine incision was examined and noted to have an extension on the right.  The bladder was noted to be intact.    The uterine incision was re approximated using an 0 Vicryl in a running locked fashion. A second horizontal imbricating stitch with the same suture was applied. The uterine incision was examined and noted to be hemostatic. The posterior cul-de-sac was cleared of all clots and products of conception. The  uterus was replaced into the abdomen and the pericolic gutters were cleared of all clots.  The uterine incision was once again reexamined and noted to be hemostatic. The Jf-O retractor was removed from the abdomen. The rectus muscle was re-approximated with 3-0 plain gut suture in a running fashion.  The fascia was re approximated using an 0 Vicryl in a running nonlocked fashion. The subcutaneous tissue was irrigated and cleared of all clots and debris. Good hemostasis was noted with Bovie electrocautery.  The skin incision was closed using 4-0 monocryl. Good hemostasis was noted. Patient tolerated the procedure well. All needle, sponge, and instrument counts were noted to be correct x 2 at the end of the procedure.  Patient was transferred to the recovery room in stable condition.

## 2024-11-13 NOTE — DISCHARGE SUMMARY
Discharge Summary - OB/GYN   Name: Yury Zaman 19 y.o. female I MRN: [id number]  Unit/Bed#: -01 I Date of Admission: 2024   Date of Service: 2024 I Hospital Day: 6    ADMISSION  Patient of: [facility]  Admission Date: 2024   Admitting Attending: Dr. Yardlie Toussaint-Foster*  Admitting Diagnoses:   Patient Active Problem List   Diagnosis    Ingestion of corrosive chemical    Hx of cardiac murmur    Asthma    Encounter for supervision of high risk pregnancy due to social problem in third trimester, antepartum    Hemoglobin S trait (HCC)    Pyelectasis of fetus on prenatal ultrasound    Family history of thrombosis    Status post  delivery    Encounter for elective induction of labor    Tobacco use affecting pregnancy, antepartum    At risk for sexually transmitted disease due to unprotected sex    S/P primary low transverse     Acute blood loss as cause of postoperative anemia    Gestational hypertension       DELIVERY  Delivery Method: , Low Transverse   Delivery Date and Time: 2024 10:01 AM  Delivery Attending: Yardlie Toussaint-FosterDr. Greiss-Coult    DISCHARGE  Discharge Date: 11/15/2024  Discharge Attending: Dr. Christy  Discharge Diagnosis:   Same, Delivered    Clinical course: Admission to Delivery    Yury Zaman is a 19 y.o.  at 40w1d admitted for eIOL. A ordaz balloon was placed.  Ordaz balloon fell out and pitocin started along with an epidural . Patient progressed to complete and pushing. During second stage of labor with no descent of caput from -1 station and fetal bradycardia after pushing. The pitocin was stopped and fetal bradycardia persist after every push.  Decision was made to proceed with primary  section for arrest of descent and Cat 2 tracing.     Reason for induction: Elective.     Pt was in spontaneous labor and managed expectantly.    Induction method:  , ,Ordaz/EASI Elective.      Delivery  Route of Delivery: , Low TransverseLTCS  Reason for  delivery: arrest of descent    Anesthesia: epiduralEpidural,   QBL:[id number]      Delivery: , Low Transverse at 2024 10:01 AM  No Laceration: none    Baby's Weight: 3500 g (7 lb 11.5 oz); 123.46    Apgar scores: 8  and 9  at 1 and 5 minutes, respectively    Clinical Course: Post-Delivery:  The post delivery course was unremarkable.    On the day of discharge, the patient was ambulating, voiding spontaneously, tolerating oral intake, and hemodynamically stable. She was able to reasonably perform all ADLs. She had appropriate bowel function. Pain was well-controlled. She was discharged home on postpartum/postop day #2 without complications. Patient was instructed to follow up with her OB as an outpatient and was given appropriate warnings to call her provider with problems or concerns.    Pertinent lab findings included:   Blood type O-.     Last three Hgb values:  Lab Results   Component Value Date    HGB 8.3 (L) 2024    HGB 5.2 (LL) 2024    HGB 6.2 (L) 2024        Problem-specific follow-up plans included the following:  Assessment & Plan  Encounter for elective induction of labor    Asthma  Avoid hemabate  Home medication ordered  Hemoglobin S trait (HCC)  FOB not involved so patient is unaware if he has any type of inherited anemia.  Hgb 9.8 on admission   2 IVs placed  Pyelectasis of fetus on prenatal ultrasound  Needs a  ultrasound at least 48 hours after birth  Status post  delivery  Admit to OBGYN   Clear liquid diet   F/u T&S, CBC, RPR   IVF LR 125cc/hr   Continuous fetal monitoring and tocometry   Analgesia at maternal request   Vertex by TAUS  Induction plan FB with pitocin    Tobacco use affecting pregnancy, antepartum  See social history  Not currently using tobacco or nicotine products   At risk for sexually transmitted disease due to unprotected sex  Patient reports she had  unprotected intercourse approximately 1 week ago and is requesting STD testing to ensure her and her baby are healthy  She denies any signs or symptoms  No lesions or abnormal discharge on admission  Gonorrhea and Chlamydia testing ordered and collected  HIV, hep B, hep C added to admission labs  S/P primary low transverse   Continue routine post partum care  [id number], HgB 9.8 -> 6.8 -> 6.4 > 6.2, s/p venofer > 5.2 > 2u pRBC > 8.3  Pain well controlled: tylenol/motrin scheduled, [place] prn  Lochia within normal limits: continue to monitor   OOB: as able, encourage ambulation  DVT ppx: SCDs  Anticipate d/c home POD4  Acute blood loss as cause of postoperative anemia  FOB not involved so patient is unaware if he has any type of inherited anemia.  Hgb 9.8 on admission -> 6.8 -> 6.4 -> 6.2, s/p venofer infusion  Patient asymptomatic  Gestational hypertension  Met criteria intrapartum  CBC, CMP wnl  Normotensive since 11/15    Discharge med list:  Contraception: undecided     Medication List      ASK your doctor about these medications     albuterol 90 mcg/act inhaler; Commonly known as: PROVENTIL HFA,VENTOLIN   HFA; Inhale 2 puffs every 6 (six) hours as needed for wheezing   ferrous sulfate 325 (65 FE) MG EC tablet; Take 1 tablet (325 mg total)   by mouth daily   omeprazole 20 mg delayed release capsule; Commonly known as: PriLOSEC;   Take 1 capsule (20 mg total) by mouth daily   Prenatal Plus 27-1 MG Tabs; Take 1 tablet by mouth daily       Condition at discharge:   good     Disposition:   Home    Planned Readmission:   No

## 2024-11-13 NOTE — ANESTHESIA POSTPROCEDURE EVALUATION
Post-Op Assessment Note    CV Status:  Stable    Pain management: adequate      Post-op block assessment: catheter intact and no complications   Mental Status:  Alert and awake   Hydration Status:  Stable   PONV Controlled:  None   Airway Patency:  Patent    No anethesia notable event occurred.    Staff: Anesthesiologist           Last Filed PACU Vitals:  Vitals Value Taken Time   Temp 97 °F (36.1 °C) 11/13/24 1130   Pulse 87 11/13/24 1130   /73 11/13/24 1130   Resp 18 11/13/24 1130   SpO2 100 % 11/13/24 1120       Modified Pritesh:  Activity: 1 (11/13/2024 11:30 AM)  Respiration: 2 (11/13/2024 11:30 AM)  Circulation: 2 (11/13/2024 11:30 AM)  Consciousness: 2 (11/13/2024 11:30 AM)  Oxygen Saturation: 2 (11/13/2024 11:30 AM)  Modified Pritesh Score: 9 (11/13/2024 11:30 AM)

## 2024-11-13 NOTE — PLAN OF CARE
Problem: Knowledge Deficit  Goal: Verbalizes understanding of labor plan  Description: Assess patient/family/caregiver's baseline knowledge level and ability to understand information.  Provide education via patient/family/caregiver's preferred learning method at appropriate level of understanding.     1. Provide teaching at level of understanding.  2. Provide teaching via preferred learning method(s).  Outcome: Completed  Goal: Patient/family/caregiver demonstrates understanding of disease process, treatment plan, medications, and discharge instructions  Description: Complete learning assessment and assess knowledge base.  Interventions:  - Provide teaching at level of understanding  - Provide teaching via preferred learning methods  Outcome: Completed     Problem: Labor & Delivery  Goal: Manages discomfort  Description: Assess and monitor for signs and symptoms of discomfort.  Assess patient's pain level regularly and per hospital policy.  Administer medications as ordered. Support use of nonpharmacological methods to help control pain such as distraction, imagery, relaxation, and application of heat and cold.  Collaborate with interdisciplinary team and patient to determine appropriate pain management plan.    1. Include patient in decisions related to comfort.  2. Offer non-pharmacological pain management interventions.  3. Report ineffective pain management to physician.  Outcome: Completed  Goal: Patient vital signs are stable  Description: 1. Assess vital signs - vaginal delivery.  Outcome: Completed     Problem: BIRTH - VAGINAL/ SECTION  Goal: Fetal and maternal status remain reassuring during the birth process  Description: INTERVENTIONS:  - Monitor vital signs  - Monitor fetal heart rate  - Monitor uterine activity  - Monitor labor progression (vaginal delivery)  - DVT prophylaxis  - Antibiotic prophylaxis  Outcome: Completed  Goal: Emotionally satisfying birthing experience for  mother/fetus  Description: Interventions:  - Assess, plan, implement and evaluate the nursing care given to the patient in labor  - Advocate the philosophy that each childbirth experience is a unique experience and support the family's chosen level of involvement and control during the labor process   - Actively participate in both the patient's and family's teaching of the birth process  - Consider cultural, [employer] and age-specific factors and plan care for the patient in labor  Outcome: Completed     Problem: PAIN - ADULT  Goal: Verbalizes/displays adequate comfort level or baseline comfort level  Description: Interventions:  - Encourage patient to monitor pain and request assistance  - Assess pain using appropriate pain scale  - Administer analgesics based on type and severity of pain and evaluate response  - Implement non-pharmacological measures as appropriate and evaluate response  - Consider cultural and social influences on pain and pain management  - Notify physician/advanced practitioner if interventions unsuccessful or patient reports new pain  Outcome: Completed     Problem: INFECTION - ADULT  Goal: Absence or prevention of progression during hospitalization  Description: INTERVENTIONS:  - Assess and monitor for signs and symptoms of infection  - Monitor lab/diagnostic results  - Monitor all insertion sites, i.e. indwelling lines, tubes, and drains  - Monitor endotracheal if appropriate and nasal secretions for changes in amount and color  - [place] appropriate cooling/warming therapies per order  - Administer medications as ordered  - Instruct and encourage patient and family to use good hand hygiene technique  - Identify and instruct in appropriate isolation precautions for identified infection/condition  Outcome: Completed  Goal: Absence of fever/infection during neutropenic period  Description: INTERVENTIONS:  - Monitor WBC    Outcome: Completed     Problem: SAFETY ADULT  Goal: Patient will  remain free of falls  Description: INTERVENTIONS:  - Educate patient/family on patient safety including physical limitations  - Instruct patient to call for assistance with activity   - Consult OT/PT to assist with strengthening/mobility   - Keep Call bell within reach  - Keep bed low and locked with side rails adjusted as appropriate  - Keep care items and personal belongings within reach  - Initiate and maintain comfort rounds  - Make Fall Risk Sign visible to staff  - Offer Toileting every  Hours, in advance of need  - Initiate/Maintain alarm  - Obtain necessary fall risk management equipment:   - Apply yellow socks and bracelet for high fall risk patients  - Consider moving patient to room near nurses station  Outcome: Completed  Goal: Maintain or return to baseline ADL function  Description: INTERVENTIONS:  -  Assess patient's ability to carry out ADLs; assess patient's baseline for ADL function and identify physical deficits which impact ability to perform ADLs (bathing, care of mouth/teeth, toileting, grooming, dressing, etc.)  - Assess/evaluate cause of self-care deficits   - Assess range of motion  - Assess patient's mobility; develop plan if impaired  - Assess patient's need for assistive devices and provide as appropriate  - Encourage maximum independence but intervene and supervise when necessary  - Involve family in performance of ADLs  - Assess for home care needs following discharge   - Consider OT consult to assist with ADL evaluation and planning for discharge  - Provide patient education as appropriate  Outcome: Completed  Goal: Maintains/Returns to pre admission functional level  Description: INTERVENTIONS:  - Perform AM-PAC 6 Click Basic Mobility/ Daily Activity assessment daily.  - Set and communicate daily mobility goal to care team and patient/family/caregiver.   - Collaborate with rehabilitation services on mobility goals if consulted  - Perform Range of Motion  times a day.  - Reposition  patient every  hours.  - Dangle patient  times a day  - Stand patient  times a day  - Ambulate patient  times a day  - Out of bed to chair times a day   - Out of bed for meals  times a day  - Out of bed for toileting  - Record patient progress and toleration of activity level   Outcome: Completed     Problem: DISCHARGE PLANNING  Goal: Discharge to home or other facility with appropriate resources  Description: INTERVENTIONS:  - Identify barriers to discharge w/patient and caregiver  - Arrange for needed discharge resources and transportation as appropriate  - Identify discharge learning needs (meds, wound care, etc.)  - Arrange for interpretive services to assist at discharge as needed  - Refer to Case Management Department for coordinating discharge planning if the patient needs post-hospital services based on physician/advanced practitioner order or complex needs related to functional status, cognitive ability, or social support system  Outcome: Completed

## 2024-11-13 NOTE — PLAN OF CARE
Problem: Knowledge Deficit  Goal: Verbalizes understanding of labor plan  Description: Assess patient/family/caregiver's baseline knowledge level and ability to understand information.  Provide education via patient/family/caregiver's preferred learning method at appropriate level of understanding.     1. Provide teaching at level of understanding.  2. Provide teaching via preferred learning method(s).  2024 by Darby Myrick RN  Outcome: Progressing  2024 by Darby Myrick RN  Outcome: Progressing     Problem: Labor & Delivery  Goal: Manages discomfort  Description: Assess and monitor for signs and symptoms of discomfort.  Assess patient's pain level regularly and per hospital policy.  Administer medications as ordered. Support use of nonpharmacological methods to help control pain such as distraction, imagery, relaxation, and application of heat and cold.  Collaborate with interdisciplinary team and patient to determine appropriate pain management plan.    1. Include patient in decisions related to comfort.  2. Offer non-pharmacological pain management interventions.  3. Report ineffective pain management to physician.  2024 by Darby Myrick RN  Outcome: Progressing  2024 by Darby Myrick RN  Outcome: Progressing  Goal: Patient vital signs are stable  Description: 1. Assess vital signs - vaginal delivery.  2024 by Darby Myrick RN  Outcome: Progressing  2024 by Darby Myrick RN  Outcome: Progressing     Problem: BIRTH - VAGINAL/ SECTION  Goal: Fetal and maternal status remain reassuring during the birth process  Description: INTERVENTIONS:  - Monitor vital signs  - Monitor fetal heart rate  - Monitor uterine activity  - Monitor labor progression (vaginal delivery)  - DVT prophylaxis  - Antibiotic prophylaxis  2024 by Darby Myrick RN  Outcome: Progressing  2024 by Darby Myrick RN  Outcome: Progressing  Goal:  Emotionally satisfying birthing experience for mother/fetus  Description: Interventions:  - Assess, plan, implement and evaluate the nursing care given to the patient in labor  - Advocate the philosophy that each childbirth experience is a unique experience and support the family's chosen level of involvement and control during the labor process   - Actively participate in both the patient's and family's teaching of the birth process  - Consider cultural, Yazidi and age-specific factors and plan care for the patient in labor  11/12/2024 2036 by Darby Myrick RN  Outcome: Progressing  11/12/2024 2036 by Darby Myrick RN  Outcome: Progressing     Problem: Knowledge Deficit  Goal: Patient/family/caregiver demonstrates understanding of disease process, treatment plan, medications, and discharge instructions  Description: Complete learning assessment and assess knowledge base.  Interventions:  - Provide teaching at level of understanding  - Provide teaching via preferred learning methods  Reactivated     Problem: PAIN - ADULT  Goal: Verbalizes/displays adequate comfort level or baseline comfort level  Description: Interventions:  - Encourage patient to monitor pain and request assistance  - Assess pain using appropriate pain scale  - Administer analgesics based on type and severity of pain and evaluate response  - Implement non-pharmacological measures as appropriate and evaluate response  - Consider cultural and social influences on pain and pain management  - Notify physician/advanced practitioner if interventions unsuccessful or patient reports new pain  Reactivated     Problem: INFECTION - ADULT  Goal: Absence or prevention of progression during hospitalization  Description: INTERVENTIONS:  - Assess and monitor for signs and symptoms of infection  - Monitor lab/diagnostic results  - Monitor all insertion sites, i.e. indwelling lines, tubes, and drains  - Monitor endotracheal if appropriate and nasal secretions for  changes in amount and color  - [place] appropriate cooling/warming therapies per order  - Administer medications as ordered  - Instruct and encourage patient and family to use good hand hygiene technique  - Identify and instruct in appropriate isolation precautions for identified infection/condition  Reactivated  Goal: Absence of fever/infection during neutropenic period  Description: INTERVENTIONS:  - Monitor WBC    Reactivated     Problem: SAFETY ADULT  Goal: Patient will remain free of falls  Description: INTERVENTIONS:  - Educate patient/family on patient safety including physical limitations  - Instruct patient to call for assistance with activity   - Consult OT/PT to assist with strengthening/mobility   - Keep Call bell within reach  - Keep bed low and locked with side rails adjusted as appropriate  - Keep care items and personal belongings within reach  - Initiate and maintain comfort rounds  - Make Fall Risk Sign visible to staff  - Apply yellow socks and bracelet for high fall risk patients  - Consider moving patient to room near nurses station  Reactivated  Goal: Maintain or return to baseline ADL function  Description: INTERVENTIONS:  -  Assess patient's ability to carry out ADLs; assess patient's baseline for ADL function and identify physical deficits which impact ability to perform ADLs (bathing, care of mouth/teeth, toileting, grooming, dressing, etc.)  - Assess/evaluate cause of self-care deficits   - Assess range of motion  - Assess patient's mobility; develop plan if impaired  - Assess patient's need for assistive devices and provide as appropriate  - Encourage maximum independence but intervene and supervise when necessary  - Involve family in performance of ADLs  - Assess for home care needs following discharge   - Consider OT consult to assist with ADL evaluation and planning for discharge  - Provide patient education as appropriate  Reactivated  Goal: Maintains/Returns to pre admission  functional level  Description: INTERVENTIONS:  - Perform AM-PAC 6 Click Basic Mobility/ Daily Activity assessment daily.  - Set and communicate daily mobility goal to care team and patient/family/caregiver.   - Collaborate with rehabilitation services on mobility goals if consulted  - Out of bed for toileting  - Record patient progress and toleration of activity level   Reactivated     Problem: DISCHARGE PLANNING  Goal: Discharge to home or other facility with appropriate resources  Description: INTERVENTIONS:  - Identify barriers to discharge w/patient and caregiver  - Arrange for needed discharge resources and transportation as appropriate  - Identify discharge learning needs (meds, wound care, etc.)  - Arrange for interpretive services to assist at discharge as needed  - Refer to Case Management Department for coordinating discharge planning if the patient needs post-hospital services based on physician/advanced practitioner order or complex needs related to functional status, cognitive ability, or social support system  Reactivated

## 2024-11-13 NOTE — CASE MANAGEMENT
Case Management Progress Note    Patient name Yury Zaman  Location /-01 MRN [id number]  : 2005 Date 2024       LOS (days): 2  Geometric Mean LOS (GMLOS) (days):   Days to GMLOS:        OBJECTIVE:        Current admission status: Inpatient  Preferred Pharmacy:    [facility] [address] [address]  Phone: [phone] Fax: [phone]    Primary Care Provider: Rhona Lazar    Primary Insurance: [facility]  Secondary Insurance:     PROGRESS NOTE:    CM met w/ MOB at bedside. Her [employer] worker, Renetta, was also welcomed into the room for the conversation.       Baby's name/gender: DEVEN Zaman   Mother of baby: Yury Zaman  Father of baby//SO: Not involved  Other children: N/A  Lives with: GIULIA was previously residing at a shelter in [place] for a week until she moved into a transitional housing apartment on through [employer] on 10/16/24. Renetta, her  through [employer], will work with MOB on establishing income so MOB/baby can move into a long term, stable environment. GIULIA is not currently responsible for any bills through this program. She does have a roommate. Mailing address on file is [employer] office but physical address is on [address] [place] [address].  Support System: GIULIA reported she has a grandmother that resides on [facility] in [place] but she has not yet discussed what type of support the grandmother is able to provide. GIULIA's father is currently incarcerated. Her mother is in [place]. It appears her biggest support is Renetta from [employer].   Baby Supplies: MOB confirmed she has all necessary baby supplies. There are a few small items she is interested in obtaining. CM will provide infant resource list and Renetta can also assist with resources as well.  Method of feeding: Breast feeding  Breast Pump if breast feeding: lactation consult pending  Government Assistance  Programs/WIC/EBT/SSI:  GIULIA confirmed she has WIC and SNAP. [facility] program provides food for household.  Work/School:  GIULIA was previously employed in [place] but has not worked in [place]. She is interested in obtaining a job after she is recovered and has had time to benjamin with baby. Renetta is able to assist with Title XX once employment is obtained.  Transportation: Renetta sometimes takes MOB to appointments but GIULIA otherwise takes the bus  Prenatal care: GIULIA stated she started her prenatal care in [facility] before she went to [place]. While in [place] she was seeing OBGYN at [facility]. Once back in [place] sh established with [facility].   Pediatrician: estelle, interested in [facility]  Community Referrals/C&Y/[facility]: GIULIA working with Renetta from [facility] through their [employer] Program. Discussed [facility] as an additional resource. MOB interested in service. Referral completed and sent to Aide at [facility]. GIULIA did mention history of being in foster care herself in [place].   Insurance for baby: [facility]     Family denies any other CM needs at this time, encouraged to contact CM as needed.

## 2024-11-13 NOTE — NURSING NOTE
Patient rang her call Amanda dangelo RN entered the room and visualized patients right forearm IV infiltrated.  Amanda went to flush left AC IV and patient vocalized pain with flush.  Amanda removed both Ivs and reported off to me.  Upon entering patients room, right forearm is visibly swollen and warm to touch. Ice pack applied, temperature taken 97.0 orally.  Per Dr. Hagen, only one IV needs to be placed. IV placed by CATY Carmichael in the left forearm, flushed without issues, and infusing began in the new IV site.  Spoke to Dr. Hagen, blood pressures are able to be taken on the upper right arm.

## 2024-11-14 PROBLEM — Z98.891 STATUS POST CESAREAN DELIVERY: Status: ACTIVE | Noted: 2024-11-12

## 2024-11-14 PROBLEM — O13.9 GESTATIONAL HYPERTENSION: Status: ACTIVE | Noted: 2024-11-14

## 2024-11-14 PROBLEM — D62 ACUTE BLOOD LOSS AS CAUSE OF POSTOPERATIVE ANEMIA: Status: ACTIVE | Noted: 2024-11-14

## 2024-11-14 LAB
BASOPHILS # BLD AUTO: 0.04 THOUSANDS/ÂΜL (ref 0–0.1)
BASOPHILS NFR BLD AUTO: 0 % (ref 0–1)
EOSINOPHIL # BLD AUTO: 0.15 THOUSAND/ÂΜL (ref 0–0.61)
EOSINOPHIL NFR BLD AUTO: 1 % (ref 0–6)
ERYTHROCYTE [DISTWIDTH] IN BLOOD BY AUTOMATED COUNT: 16.1 % (ref 11.6–15.1)
ERYTHROCYTE [DISTWIDTH] IN BLOOD BY AUTOMATED COUNT: 16.2 % (ref 11.6–15.1)
HCT VFR BLD AUTO: 18.6 % (ref 34.8–46.1)
HCT VFR BLD AUTO: 19.1 % (ref 34.8–46.1)
HGB BLD-MCNC: 6.2 G/DL (ref 11.5–15.4)
HGB BLD-MCNC: 6.4 G/DL (ref 11.5–15.4)
IMM GRANULOCYTES # BLD AUTO: 0.37 THOUSAND/UL (ref 0–0.2)
IMM GRANULOCYTES NFR BLD AUTO: 2 % (ref 0–2)
LYMPHOCYTES # BLD AUTO: 1.79 THOUSANDS/ÂΜL (ref 0.6–4.47)
LYMPHOCYTES NFR BLD AUTO: 8 % (ref 14–44)
MCH RBC QN AUTO: 27 PG (ref 26.8–34.3)
MCH RBC QN AUTO: 27.2 PG (ref 26.8–34.3)
MCHC RBC AUTO-ENTMCNC: 33.3 G/DL (ref 31.4–37.4)
MCHC RBC AUTO-ENTMCNC: 33.5 G/DL (ref 31.4–37.4)
MCV RBC AUTO: 81 FL (ref 82–98)
MCV RBC AUTO: 81 FL (ref 82–98)
MONOCYTES # BLD AUTO: 1.43 THOUSAND/ÂΜL (ref 0.17–1.22)
MONOCYTES NFR BLD AUTO: 6 % (ref 4–12)
NEUTROPHILS # BLD AUTO: 19.51 THOUSANDS/ÂΜL (ref 1.85–7.62)
NEUTS SEG NFR BLD AUTO: 83 % (ref 43–75)
NRBC BLD AUTO-RTO: 0 /100 WBCS
PLATELET # BLD AUTO: 163 THOUSANDS/UL (ref 149–390)
PLATELET # BLD AUTO: 166 THOUSANDS/UL (ref 149–390)
PMV BLD AUTO: 10.4 FL (ref 8.9–12.7)
PMV BLD AUTO: 10.8 FL (ref 8.9–12.7)
RBC # BLD AUTO: 2.3 MILLION/UL (ref 3.81–5.12)
RBC # BLD AUTO: 2.35 MILLION/UL (ref 3.81–5.12)
WBC # BLD AUTO: 23.27 THOUSAND/UL (ref 4.31–10.16)
WBC # BLD AUTO: 23.29 THOUSAND/UL (ref 4.31–10.16)

## 2024-11-14 PROCEDURE — 85027 COMPLETE CBC AUTOMATED: CPT | Performed by: OBSTETRICS & GYNECOLOGY

## 2024-11-14 PROCEDURE — 99024 POSTOP FOLLOW-UP VISIT: CPT | Performed by: STUDENT IN AN ORGANIZED HEALTH CARE EDUCATION/TRAINING PROGRAM

## 2024-11-14 RX ORDER — KETOROLAC TROMETHAMINE 30 MG/ML
15 INJECTION, SOLUTION INTRAMUSCULAR; INTRAVENOUS ONCE
Status: COMPLETED | OUTPATIENT
Start: 2024-11-14 | End: 2024-11-14

## 2024-11-14 RX ORDER — IBUPROFEN 600 MG/1
600 TABLET, FILM COATED ORAL EVERY 6 HOURS PRN
Status: DISCONTINUED | OUTPATIENT
Start: 2024-11-14 | End: 2024-11-14

## 2024-11-14 RX ORDER — FERROUS SULFATE 325(65) MG
325 TABLET ORAL
Status: DISCONTINUED | OUTPATIENT
Start: 2024-11-14 | End: 2024-11-14

## 2024-11-14 RX ORDER — IBUPROFEN 600 MG/1
600 TABLET, FILM COATED ORAL EVERY 6 HOURS SCHEDULED
Status: DISCONTINUED | OUTPATIENT
Start: 2024-11-14 | End: 2024-11-17 | Stop reason: HOSPADM

## 2024-11-14 RX ADMIN — IBUPROFEN 600 MG: 600 TABLET, FILM COATED ORAL at 22:02

## 2024-11-14 RX ADMIN — ACETAMINOPHEN 650 MG: 325 TABLET, FILM COATED ORAL at 01:34

## 2024-11-14 RX ADMIN — POLYETHYLENE GLYCOL 3350 17 G: 17 POWDER, FOR SOLUTION ORAL at 09:00

## 2024-11-14 RX ADMIN — IRON SUCROSE 300 MG: 20 INJECTION, SOLUTION INTRAVENOUS at 05:13

## 2024-11-14 RX ADMIN — KETOROLAC TROMETHAMINE 15 MG: 30 INJECTION, SOLUTION INTRAMUSCULAR; INTRAVENOUS at 16:28

## 2024-11-14 RX ADMIN — OXYCODONE 5 MG: 5 TABLET ORAL at 16:09

## 2024-11-14 RX ADMIN — PANTOPRAZOLE SODIUM 40 MG: 40 TABLET, DELAYED RELEASE ORAL at 07:16

## 2024-11-14 RX ADMIN — ACETAMINOPHEN 650 MG: 325 TABLET, FILM COATED ORAL at 19:57

## 2024-11-14 RX ADMIN — IBUPROFEN 600 MG: 600 TABLET, FILM COATED ORAL at 01:34

## 2024-11-14 RX ADMIN — ACETAMINOPHEN 650 MG: 325 TABLET, FILM COATED ORAL at 08:59

## 2024-11-14 RX ADMIN — OXYCODONE HYDROCHLORIDE 10 MG: 5 TABLET ORAL at 21:00

## 2024-11-14 RX ADMIN — IRON SUCROSE 200 MG: 20 INJECTION, SOLUTION INTRAVENOUS at 00:10

## 2024-11-14 NOTE — UTILIZATION REVIEW
"NOTIFICATION OF INPATIENT ADMISSION   MATERNITY/DELIVERY AUTHORIZATION REQUEST   SERVICING FACILITY:   [facility]  Parent Child Health - L&D, [place], NICU  [address] [place] [address]  Tax ID: [id number]  NPI: [id number] ATTENDING PROVIDER:  Attending Name and NPI#: Yardlie Toussaint-foster, Do [[id number]]  Address: [address] [place] [address]  Phone: [phone]     ADMISSION INFORMATION:  Place of Service: Inpatient [facility]  Place of Service Code: 21  Inpatient Admission Date/Time: 24  9:35 PM  Discharge Date/Time: No discharge date for patient encounter.  Admitting Diagnosis Code/Description:  Encounter for full-term uncomplicated delivery [O80]   Mother: Yury Zaman 2005 Estimated Date of Delivery: 24  Delivering clinician: Yardlie Toussaint-Foster   OB History          1    Para   1    Term   1            AB        Living   1         SAB        IAB        Ectopic        Multiple   0    Live Births   1               [place] Name & MRN:   Information for the patient's :  Salvatore Zaman Boy (Yury) [[id number]]    Delivery Information:  Sex: male  Delivered 2024 10:01 AM by , Low Transverse; Gestational Age: 40w1d    [place] Measurements:  Weight: 7 lb 11.5 oz (3500 g);  Height: 21.5\"    APGAR 1 minute 5 minutes 10 minutes   Totals: 8 9       UTILIZATION REVIEW CONTACT:  Paula Choi, Utilization   Network Utilization Review Department  Phone: [phone]  Fax [phone]  Email: [email or web address]  Contact for approvals/pending authorizations, clinical reviews, and discharge.   PHYSICIAN ADVISORY SERVICES:  Medical Necessity Denial & Egqb-uw-Spzc Review  Phone: [phone]  Fax: [phone]  Email: [email or web address]   DISCHARGE SUPPORT TEAM:  For Patients Discharge Needs & Updates  Phone: [phone] opt. 2 Fax: " [phone]  Email: [email or web address]

## 2024-11-14 NOTE — NURSING NOTE
Pt rang- noticed swelling/pain at IV site. IV infiltrated during Iron infusion. Infusion stopped.  Dr. Hagen made aware. IV was removed per Dr Hagen, not placing a new one at this time.

## 2024-11-14 NOTE — QUICK NOTE
Patient's evening CBC resulted at 6.8.  Delivery was complicated by postpartum hemorrhage of 1.3 L.  Patient is asymptomatic and feeling well at this time.  She is not tachycardic and her blood pressures are normotensive.  Plan to run an iron infusion and recheck CBC in the morning.

## 2024-11-14 NOTE — NURSING NOTE
Dr. Toussaint-Foster at bedside- informed pt of need for a new IV due to low Hgb.  Per Dr SYED- new IV placed, Venofer infusion running. CBC sent.

## 2024-11-14 NOTE — PLAN OF CARE
Problem: POSTPARTUM  Goal: Experiences normal postpartum course  Description: INTERVENTIONS:  - Monitor maternal vital signs  - Assess uterine involution and lochia  2024 by Conchita Celestin RN  Outcome: Progressing  2024 by Conchita Celestin RN  Outcome: Progressing  Goal: Appropriate maternal -  bonding  Description: INTERVENTIONS:  - Identify family support  - Assess for appropriate maternal/infant bonding   -Encourage maternal/infant bonding opportunities  - Referral to  or  as needed  2024 111 by Conchita Celestin RN  Outcome: Progressing  2024 110 by Conchita Celestin RN  Outcome: Progressing  Goal: Establishment of infant feeding pattern  Description: INTERVENTIONS:  - Assess breast/bottle feeding  - Refer to lactation as needed  2024 by Conchita Celestin RN  Outcome: Progressing  2024 by Conchita Celestin RN  Outcome: Progressing  Goal: Incision(s), wounds(s) or drain site(s) healing without S/S of infection  Description: INTERVENTIONS  - Assess and document dressing, incision, wound bed, drain sites and surrounding tissue  - Provide patient and family education    2024 by Conchita Celestin RN  Outcome: Progressing  2024 by Conchita Celestin RN  Outcome: Progressing     Problem: PAIN - ADULT  Goal: Verbalizes/displays adequate comfort level or baseline comfort level  Description: Interventions:  - Encourage patient to monitor pain and request assistance  - Assess pain using appropriate pain scale  - Administer analgesics based on type and severity of pain and evaluate response  - Implement non-pharmacological measures as appropriate and evaluate response  - Consider cultural and social influences on pain and pain management  - Notify physician/advanced practitioner if interventions unsuccessful or patient reports new pain  2024 by Conchita Celestin RN  Outcome: Progressing  2024  1109 by Conchita Celestin RN  Outcome: Progressing     Problem: INFECTION - ADULT  Goal: Absence or prevention of progression during hospitalization  Description: INTERVENTIONS:  - Assess and monitor for signs and symptoms of infection  - Monitor lab/diagnostic results  - Monitor all insertion sites, i.e. indwelling lines, tubes, and drains  - Monitor endotracheal if appropriate and nasal secretions for changes in amount and color  - [place] appropriate cooling/warming therapies per order  - Administer medications as ordered  - Instruct and encourage patient and family to use good hand hygiene technique  - Identify and instruct in appropriate isolation precautions for identified infection/condition  11/14/2024 1112 by Conchita Celestin RN  Outcome: Progressing  11/14/2024 1109 by Conchita Celestin RN  Outcome: Progressing  Goal: Absence of fever/infection during neutropenic period  Description: INTERVENTIONS:  - Monitor WBC    11/14/2024 1112 by Conchita Celestin RN  Outcome: Progressing  11/14/2024 1109 by Conchita Celestin RN  Outcome: Progressing     Problem: SAFETY ADULT  Goal: Patient will remain free of falls  Description: INTERVENTIONS:  - Instruct patient to call for assistance with activity   - Keep Call bell within reach  - Keep bed low and locked with side rails adjusted as appropriate  - Keep care items and personal belongings within reach  - Initiate and maintain comfort rounds  11/14/2024 1112 by Conchita Celestin RN  Outcome: Progressing  11/14/2024 1109 by Conchita Celestin RN  Outcome: Progressing  Goal: Maintain or return to baseline ADL function  Description: INTERVENTIONS:  - Provide patient education as appropriate  11/14/2024 1112 by Conchita Celestin RN  Outcome: Progressing  11/14/2024 1109 by Conchita Celestin RN  Outcome: Progressing  Goal: Maintains/Returns to pre admission functional level  Description: INTERVENTIONS:  - Record patient progress and toleration of activity level    11/14/2024 1112 by Conchita Celestin RN  Outcome: Progressing  11/14/2024 1109 by Conchita Celestin RN  Outcome: Progressing     Problem: DISCHARGE PLANNING  Goal: Discharge to home or other facility with appropriate resources  Description: INTERVENTIONS:  - Identify barriers to discharge w/patient and caregiver  - Arrange for needed discharge resources and transportation as appropriate  - Identify discharge learning needs (meds, wound care, etc.)  - Arrange for interpretive services to assist at discharge as needed  - Refer to Case Management Department for coordinating discharge planning if the patient needs post-hospital services based on physician/advanced practitioner order or complex needs related to functional status, cognitive ability, or social support system  11/14/2024 1112 by Conchita Celestin RN  Outcome: Progressing  11/14/2024 1109 by Conchita Celestin RN  Outcome: Progressing     Problem: Knowledge Deficit  Goal: Patient/family/caregiver demonstrates understanding of disease process, treatment plan, medications, and discharge instructions  Description: Complete learning assessment and assess knowledge base.  Interventions:  - Provide teaching at level of understanding  - Provide teaching via preferred learning methods  11/14/2024 1112 by Conchita Celestin RN  Outcome: Progressing  11/14/2024 1109 by Conchita Celestin RN  Outcome: Progressing

## 2024-11-14 NOTE — CASE MANAGEMENT
Case Management Progress Note    Patient name Yury Zaman  Location /-01 MRN [id number]  : 2005 Date 2024       LOS (days): 3  Geometric Mean LOS (GMLOS) (days):   Days to GMLOS:        OBJECTIVE:        Current admission status: Inpatient  Preferred Pharmacy:    PHARMACY [address] [address]  Phone: [phone] Fax: [phone]    Primary Care Provider: Rhona Lazar    Primary Insurance: [facility]  Secondary Insurance:     PROGRESS NOTE:    CM met with MOB at bedside. She reported Renetta was here earlier this am to visit. [employer] approved Zomee pump. CM delivered to MOB. Delivery ticket signed and placed in bin for DME liaison. Confirmed physical address is [address] [place] [address] [address]. She'd like to keep [facility] address as primary mailing on file. Confirmed baby will f/u with pediatrician at [facility]. Explained they have a pediatric social worker there if MOB needs further assistance. She declined a referral to OPCM at this time and preferred to wait until baby is established with care and settled to determine any ongoing needs. Encouraged her to reach out to CM with any questions or concerns.

## 2024-11-14 NOTE — PLAN OF CARE
Problem: POSTPARTUM  Goal: Experiences normal postpartum course  Description: INTERVENTIONS:  - Monitor maternal vital signs  - Assess uterine involution and lochia  2024 by Joycelyn Lloyd RN  Outcome: Progressing  2024 by Joycelyn Lloyd RN  Outcome: Progressing  Goal: Appropriate maternal -  bonding  Description: INTERVENTIONS:  - Identify family support  - Assess for appropriate maternal/infant bonding   -Encourage maternal/infant bonding opportunities  - Referral to  or  as needed  2024 by Joycelyn Lloyd RN  Outcome: Progressing  2024 by Joycelyn Lloyd RN  Outcome: Progressing  Goal: Establishment of infant feeding pattern  Description: INTERVENTIONS:  - Assess breast/bottle feeding  - Refer to lactation as needed  2024 by Joycelyn Lloyd RN  Outcome: Progressing  2024 by Joycelyn Lloyd RN  Outcome: Progressing  Goal: Incision(s), wounds(s) or drain site(s) healing without S/S of infection  Description: INTERVENTIONS  - Assess and document dressing, incision, wound bed, drain sites and surrounding tissue  - Provide patient and family education    2024 by Joycelyn Lloyd RN  Outcome: Progressing  2024 by Joycelyn Lloyd RN  Outcome: Progressing     Problem: PAIN - ADULT  Goal: Verbalizes/displays adequate comfort level or baseline comfort level  Description: Interventions:  - Encourage patient to monitor pain and request assistance  - Assess pain using appropriate pain scale  - Administer analgesics based on type and severity of pain and evaluate response  - Implement non-pharmacological measures as appropriate and evaluate response  - Consider cultural and social influences on pain and pain management  - Notify physician/advanced practitioner if interventions unsuccessful or patient reports new pain  Outcome: Progressing     Problem: INFECTION - ADULT  Goal: Absence or prevention of progression  during hospitalization  Description: INTERVENTIONS:  - Assess and monitor for signs and symptoms of infection  - Monitor lab/diagnostic results  - Monitor all insertion sites, i.e. indwelling lines, tubes, and drains  - Monitor endotracheal if appropriate and nasal secretions for changes in amount and color  - [place] appropriate cooling/warming therapies per order  - Administer medications as ordered  - Instruct and encourage patient and family to use good hand hygiene technique  - Identify and instruct in appropriate isolation precautions for identified infection/condition  Outcome: Progressing  Goal: Absence of fever/infection during neutropenic period  Description: INTERVENTIONS:  - Monitor WBC    Outcome: Progressing     Problem: SAFETY ADULT  Goal: Patient will remain free of falls  Description: INTERVENTIONS:  - Educate patient/family on patient safety including physical limitations  - Instruct patient to call for assistance with activity   - Consult OT/PT to assist with strengthening/mobility   - Keep Call bell within reach  - Keep bed low and locked with side rails adjusted as appropriate  - Keep care items and personal belongings within reach  - Initiate and maintain comfort rounds  - Make Fall Risk Sign visible to staff  - Offer Toileting every Hours, in advance of need  - Initiate/Maintain alarm  - Obtain necessary fall risk management equipment:   - Apply yellow socks and bracelet for high fall risk patients  - Consider moving patient to room near nurses station  Outcome: Progressing  Goal: Maintain or return to baseline ADL function  Description: INTERVENTIONS:  -  Assess patient's ability to carry out ADLs; assess patient's baseline for ADL function and identify physical deficits which impact ability to perform ADLs (bathing, care of mouth/teeth, toileting, grooming, dressing, etc.)  - Assess/evaluate cause of self-care deficits   - Assess range of motion  - Assess patient's mobility; develop plan  if impaired  - Assess patient's need for assistive devices and provide as appropriate  - Encourage maximum independence but intervene and supervise when necessary  - Involve family in performance of ADLs  - Assess for home care needs following discharge   - Consider OT consult to assist with ADL evaluation and planning for discharge  - Provide patient education as appropriate  Outcome: Progressing  Goal: Maintains/Returns to pre admission functional level  Description: INTERVENTIONS:  - Perform AM-PAC 6 Click Basic Mobility/ Daily Activity assessment daily.  - Set and communicate daily mobility goal to care team and patient/family/caregiver.   - Collaborate with rehabilitation services on mobility goals if consulted  - Perform Range of Motion  times a day.  - Reposition patient every  hours.  - Dangle patient  times a day  - Stand patient  times a day  - Ambulate patient  times a day  - Out of bed to chair  times a day   - Out of bed for meals times a day  - Out of bed for toileting  - Record patient progress and toleration of activity level   Outcome: Progressing     Problem: DISCHARGE PLANNING  Goal: Discharge to home or other facility with appropriate resources  Description: INTERVENTIONS:  - Identify barriers to discharge w/patient and caregiver  - Arrange for needed discharge resources and transportation as appropriate  - Identify discharge learning needs (meds, wound care, etc.)  - Arrange for interpretive services to assist at discharge as needed  - Refer to Case Management Department for coordinating discharge planning if the patient needs post-hospital services based on physician/advanced practitioner order or complex needs related to functional status, cognitive ability, or social support system  Outcome: Progressing     Problem: Knowledge Deficit  Goal: Patient/family/caregiver demonstrates understanding of disease process, treatment plan, medications, and discharge instructions  Description: Complete  learning assessment and assess knowledge base.  Interventions:  - Provide teaching at level of understanding  - Provide teaching via preferred learning methods  Outcome: Progressing

## 2024-11-14 NOTE — PROGRESS NOTES
Progress Note - OB/GYN  Yury Zaman 19 y.o. female MRN: [id number]  Unit/Bed#:  416-01 Encounter: [id number]    Assessment and Plan     Yury Zaman is a patient of: [facility]. She is PPD# 1 s/p  primary  section, low transverse incision in the setting of arrest of decent and category II tracing. Recovering well and is stable       At risk for sexually transmitted disease due to unprotected sex  Assessment & Plan  Gonorrhea and Chlamydia testing neg  HIV, hep B, hep C neg    Tobacco use affecting pregnancy, antepartum  Assessment & Plan  See social history  Not currently using tobacco or nicotine products     Status post   Assessment & Plan       Continue routine post partum care       [id number], HgB 9.8 -> 6.8 -> 6.4, venofer ordered  Pain well controlled: tylenol/motrin scheduled, [place] prn  Lochia within normal limits: continue to monitor   OOB: as able, encourage ambulation  Passing flatus  Pending void trial  DVT ppx: SCDs    Pyelectasis of fetus on prenatal ultrasound  Assessment & Plan  Needs a  ultrasound at least 48 hours after birth    Hemoglobin S trait (HCC)  Assessment & Plan  FOB not involved so patient is unaware if he has any type of inherited anemia.  Hgb 9.8 on admission -> 6.8 -> 6.4 -> 6.2, s/p venofer infusion  Patient asymptomatic  2 IVs placed    Asthma  Assessment & Plan  Avoid hemabate  Home medication ordered    Gestational hypertension       Met criteria intrapartum       CBC wnl       CMP pending      Disposition    - Anticipate discharge home on PPD# 2-4      Subjective/Objective     Chief Complaint: Postpartum State     Subjective:    Yury Zaman is PPD/POD#1 s/p  primary  section, low transverse incision. She has no current complaints.  Pain is well controlled.  Patient is currently voiding.  She is ambulating.  Patient is currently passing flatus and has had no bowel movement. She is tolerating  "PO, and denies nausea or vomitting. Patient denies fever, chills, chest pain, shortness of breath, or calf tenderness. Lochia is minimal. She is  Breastfeeding. She is recovering well and is stable.       Vitals:   /68   Pulse 82   Temp 97.7 °F (36.5 °C) (Oral)   Resp 20   Ht 5' 3\" (1.6 m)   Wt 62.6 kg (138 lb)   LMP 02/06/2024   SpO2 99%   Breastfeeding Yes   BMI 24.45 kg/m²       Intake/Output Summary (Last 24 hours) at 11/14/2024 0838  Last data filed at 11/14/2024 0700  Gross per 24 hour   Intake 3931.64 ml   Output 3717 ml   Net 214.64 ml       Invasive Devices       Peripheral Intravenous Line  Duration             Peripheral IV 11/14/24 Left;Ventral (anterior) Forearm <1 day                    Physical Exam:   GEN: Yury Zaman appears well, alert and oriented x 3, pleasant and cooperative   CARDIO: RRR, no murmurs or rubs  RESP:  CTAB, no wheezes or rales  ABDOMEN: soft, no tenderness, no distention, fundus firm at midline, Incision C/D/I  EXTREMITIES: SCDs on, non tender, no erythema, b/l [place]'s sign negative      Labs:     Hemoglobin   Date Value Ref Range Status   11/14/2024 6.2 (L) 11.5 - 15.4 g/dL Final   11/14/2024 6.4 (L) 11.5 - 15.4 g/dL Final     WBC   Date Value Ref Range Status   11/14/2024 23.29 (H) 4.31 - 10.16 Thousand/uL Final   11/14/2024 23.27 (H) 4.31 - 10.16 Thousand/uL Final     Platelets   Date Value Ref Range Status   11/14/2024 166 149 - 390 Thousands/uL Final   11/14/2024 163 149 - 390 Thousands/uL Final     Creatinine   Date Value Ref Range Status   09/14/2022 0.92 0.60 - 1.30 mg/dL Final     Comment:     Standardized to IDMS reference method   09/12/2022 0.85 0.60 - 1.30 mg/dL Final     Comment:     Standardized to IDMS reference method   11/05/2020 0.92 (H) 0.60 - 0.88 mg/dL Final     AST   Date Value Ref Range Status   09/12/2022 15 13 - 39 U/L Final     Comment:     Specimen collection should occur prior to Sulfasalazine administration due to the " potential for falsely depressed results.    09/11/2022 23 13 - 39 U/L Final     Comment:     Specimen collection should occur prior to Sulfasalazine administration due to the potential for falsely depressed results.    11/05/2020 21 <41 U/L Final     ALT   Date Value Ref Range Status   09/12/2022 7 7 - 52 U/L Final     Comment:     Specimen collection should occur prior to Sulfasalazine administration due to the potential for falsely depressed results.    09/11/2022 12 7 - 52 U/L Final     Comment:     Specimen collection should occur prior to Sulfasalazine administration due to the potential for falsely depressed results.    11/05/2020 18 <56 U/L Final          Adrienne Marrero MD  11/14/2024  8:38 AM

## 2024-11-15 PROCEDURE — 99024 POSTOP FOLLOW-UP VISIT: CPT | Performed by: OBSTETRICS & GYNECOLOGY

## 2024-11-15 PROCEDURE — NC001 PR NO CHARGE: Performed by: OBSTETRICS & GYNECOLOGY

## 2024-11-15 RX ORDER — ACETAMINOPHEN 325 MG/1
650 TABLET ORAL EVERY 6 HOURS SCHEDULED
Qty: 12 TABLET | Refills: 0 | Status: CANCELLED | OUTPATIENT
Start: 2024-11-15 | End: 2024-11-17

## 2024-11-15 RX ADMIN — POLYETHYLENE GLYCOL 3350 17 G: 17 POWDER, FOR SOLUTION ORAL at 09:44

## 2024-11-15 RX ADMIN — ACETAMINOPHEN 650 MG: 325 TABLET, FILM COATED ORAL at 03:46

## 2024-11-15 RX ADMIN — IBUPROFEN 600 MG: 600 TABLET, FILM COATED ORAL at 05:59

## 2024-11-15 RX ADMIN — OXYCODONE HYDROCHLORIDE 10 MG: 5 TABLET ORAL at 03:46

## 2024-11-15 RX ADMIN — OXYCODONE 5 MG: 5 TABLET ORAL at 09:42

## 2024-11-15 RX ADMIN — IBUPROFEN 600 MG: 600 TABLET, FILM COATED ORAL at 19:08

## 2024-11-15 RX ADMIN — ACETAMINOPHEN 650 MG: 325 TABLET, FILM COATED ORAL at 15:54

## 2024-11-15 RX ADMIN — PANTOPRAZOLE SODIUM 40 MG: 40 TABLET, DELAYED RELEASE ORAL at 05:59

## 2024-11-15 RX ADMIN — IBUPROFEN 600 MG: 600 TABLET, FILM COATED ORAL at 13:07

## 2024-11-15 RX ADMIN — ACETAMINOPHEN 650 MG: 325 TABLET, FILM COATED ORAL at 21:42

## 2024-11-15 RX ADMIN — OXYCODONE HYDROCHLORIDE 10 MG: 5 TABLET ORAL at 15:53

## 2024-11-15 RX ADMIN — ACETAMINOPHEN 650 MG: 325 TABLET, FILM COATED ORAL at 09:43

## 2024-11-15 RX ADMIN — OXYCODONE 5 MG: 5 TABLET ORAL at 19:08

## 2024-11-15 NOTE — LACTATION NOTE
This note was copied from a baby's chart.    In to see new Mom. Baby resting well at this time. Mom states baby is nursing well. Quick review of  positioning and alignment. Mom is encouraged to:     - Bring baby up to the breast (use of pillows to elevate so baby's torso is against mom's breasts)   - Skin to skin for feedings with top hand exposed to show signs of satiation   - Chin deep into breast tissue (make baby look up to the nipple)   - nose aligned to the nipple   -Wait for wide gape, drag chin on the breast so nipple is aimed at the upper, back palate  - Cheek should be touching breast   - Deep, firm hold of baby with ear, shoulder, hip alignment     11/15/24 1030   Maternal Information   Has mother  before? No   Infant to breast within first hour of birth? No   Breastfeeding Delayed Due to Maternal status   Exclusive Pump and Bottle Feed No   LATCH Documentation   Having latch problems? No  (as per NEW Mom; Documented latch score = 8)   Breasts/Nipples   Intervention Other (comment)  (Review good latch/feed & support available)   Breastfeeding Progress Not yet established   Breast Pump   Pump 3  (CM consult placed yesterday for pump)   Patient Follow-Up   Lactation Consult Status 2   Follow-Up Type Inpatient;Call as needed   Other OB Lactation Documentation    Additional Problem Noted New Mom states nursing is going well; denies pain OR need for support  at this time  (has Booklets @ bedside)       Encoraged nursing parent to call for assistance, questions and concerns.  Extension number for inpatient lactation support provided.

## 2024-11-15 NOTE — PLAN OF CARE
Problem: POSTPARTUM  Goal: Experiences normal postpartum course  Description: INTERVENTIONS:  - Monitor maternal vital signs  - Assess uterine involution and lochia  Outcome: Progressing  Goal: Appropriate maternal -  bonding  Description: INTERVENTIONS:  - Identify family support  - Assess for appropriate maternal/infant bonding   -Encourage maternal/infant bonding opportunities  - Referral to  or  as needed  Outcome: Progressing  Goal: Establishment of infant feeding pattern  Description: INTERVENTIONS:  - Assess breast/bottle feeding  - Refer to lactation as needed  Outcome: Progressing  Goal: Incision(s), wounds(s) or drain site(s) healing without S/S of infection  Description: INTERVENTIONS  - Assess and document dressing, incision, wound bed, drain sites and surrounding tissue  - Provide patient and family education    Outcome: Progressing     Problem: PAIN - ADULT  Goal: Verbalizes/displays adequate comfort level or baseline comfort level  Description: Interventions:  - Encourage patient to monitor pain and request assistance  - Assess pain using appropriate pain scale  - Administer analgesics based on type and severity of pain and evaluate response  - Implement non-pharmacological measures as appropriate and evaluate response  - Consider cultural and social influences on pain and pain management  - Notify physician/advanced practitioner if interventions unsuccessful or patient reports new pain  Outcome: Progressing     Problem: INFECTION - ADULT  Goal: Absence or prevention of progression during hospitalization  Description: INTERVENTIONS:  - Assess and monitor for signs and symptoms of infection  - Monitor lab/diagnostic results  - Monitor all insertion sites, i.e. indwelling lines, tubes, and drains  - Monitor endotracheal if appropriate and nasal secretions for changes in amount and color  - [place] appropriate cooling/warming therapies per order  - Administer  medications as ordered  - Instruct and encourage patient and family to use good hand hygiene technique  - Identify and instruct in appropriate isolation precautions for identified infection/condition  Outcome: Progressing  Goal: Absence of fever/infection during neutropenic period  Description: INTERVENTIONS:  - Monitor WBC    Outcome: Progressing     Problem: SAFETY ADULT  Goal: Patient will remain free of falls  Description: INTERVENTIONS:  - Instruct patient to call for assistance with activity   - Keep Call bell within reach  - Keep bed low and locked with side rails adjusted as appropriate  - Keep care items and personal belongings within reach  - Initiate and maintain comfort rounds  Outcome: Progressing  Goal: Maintain or return to baseline ADL function  Description: INTERVENTIONS:  - Provide patient education as appropriate  Outcome: Progressing  Goal: Maintains/Returns to pre admission functional level  Description: INTERVENTIONS:  - Record patient progress and toleration of activity level   Outcome: Progressing     Problem: DISCHARGE PLANNING  Goal: Discharge to home or other facility with appropriate resources  Description: INTERVENTIONS:  - Identify barriers to discharge w/patient and caregiver  - Arrange for needed discharge resources and transportation as appropriate  - Identify discharge learning needs (meds, wound care, etc.)  - Arrange for interpretive services to assist at discharge as needed  - Refer to Case Management Department for coordinating discharge planning if the patient needs post-hospital services based on physician/advanced practitioner order or complex needs related to functional status, cognitive ability, or social support system  Outcome: Progressing     Problem: Knowledge Deficit  Goal: Patient/family/caregiver demonstrates understanding of disease process, treatment plan, medications, and discharge instructions  Description: Complete learning assessment and assess knowledge  base.  Intervention  - Provide teaching at level of understanding  - Provide teaching via preferred learning methods  Outcome: Progressing

## 2024-11-15 NOTE — PROGRESS NOTES
Progress Note - OB/GYN  Yury Zaman 19 y.o. female MRN: [id number]  Unit/Bed#:  416-01 Encounter: [id number]    Assessment and Plan     Yury Zaman is a patient of: [facility]. She is PPD# 2 s/p  primary  section, low transverse incision in the setting of arrest of descent and category II tracing. Her post partum period was complicated by asymptomatic anemia. Recovering well and is stable       At risk for sexually transmitted disease due to unprotected sex  Assessment & Plan  Gonorrhea and Chlamydia testing neg  HIV, hep B, hep C neg    Tobacco use affecting pregnancy, antepartum  Assessment & Plan  See social history  Not currently using tobacco or nicotine products     Status post   Assessment & Plan       Continue routine post partum care       [id number], HgB 9.8 -> 6.8 -> 6.4 > 6.2, s/p venofer  Pain well controlled: tylenol/motrin scheduled, [place] prn  Lochia within normal limits: continue to monitor   OOB: as able, encourage ambulation  Passing flatus  Pending void trial  DVT ppx: SCDs    Pyelectasis of fetus on prenatal ultrasound  Assessment & Plan  Needs a  ultrasound at least 48 hours after birth    Hemoglobin S trait (HCC)  Assessment & Plan  FOB not involved so patient is unaware if he has any type of inherited anemia.  Hgb 9.8 on admission -> 6.8 -> 6.4 -> 6.2, s/p venofer infusion  Patient asymptomatic  2 IVs placed    Asthma  Assessment & Plan  Avoid hemabate  Home medication ordered    Gestational hypertension       Met criteria intrapartum       CBC wnl       CMP pending      Disposition    - Anticipate discharge home on PPD# 3-4      Subjective/Objective     Chief Complaint: Postpartum State     Subjective:    Yury Zaman is PPD/POD#1 s/p  primary  section, low transverse incision. She has no current complaints.  Pain is well controlled.  Patient is currently voiding.  She is ambulating.  Patient is currently  "passing flatus and has had no bowel movement. She is tolerating PO, and denies nausea or vomitting. Patient denies fever, chills, chest pain, shortness of breath, or calf tenderness. Lochia is minimal. She is  Breastfeeding. She is recovering well and is stable.       Vitals:   /80 (BP Location: Right arm)   Pulse 93   Temp 98.1 °F (36.7 °C) (Oral)   Resp 16   Ht 5' 3\" (1.6 m)   Wt 62.6 kg (138 lb)   LMP 02/06/2024   SpO2 98%   Breastfeeding Yes   BMI 24.45 kg/m²       Intake/Output Summary (Last 24 hours) at 11/15/2024 0603  Last data filed at 11/14/2024 2101  Gross per 24 hour   Intake --   Output 3000 ml   Net -3000 ml       Invasive Devices       Peripheral Intravenous Line  Duration             Peripheral IV 11/14/24 Left;Ventral (anterior) Forearm 1 day                    Physical Exam:   GEN: Yury Zaman appears well, alert and oriented x 3, pleasant and cooperative   CARDIO: RRR, no murmurs or rubs  RESP:  CTAB, no wheezes or rales  ABDOMEN: soft, no tenderness, no distention, fundus firm at midline, Incision C/D/I  EXTREMITIES: SCDs on, non tender, no erythema, b/l [place]'s sign negative      Labs:     Hemoglobin   Date Value Ref Range Status   11/14/2024 6.2 (L) 11.5 - 15.4 g/dL Final   11/14/2024 6.4 (L) 11.5 - 15.4 g/dL Final     WBC   Date Value Ref Range Status   11/14/2024 23.29 (H) 4.31 - 10.16 Thousand/uL Final   11/14/2024 23.27 (H) 4.31 - 10.16 Thousand/uL Final     Platelets   Date Value Ref Range Status   11/14/2024 166 149 - 390 Thousands/uL Final   11/14/2024 163 149 - 390 Thousands/uL Final     Creatinine   Date Value Ref Range Status   09/14/2022 0.92 0.60 - 1.30 mg/dL Final     Comment:     Standardized to IDMS reference method   09/12/2022 0.85 0.60 - 1.30 mg/dL Final     Comment:     Standardized to IDMS reference method   11/05/2020 0.92 (H) 0.60 - 0.88 mg/dL Final     AST   Date Value Ref Range Status   09/12/2022 15 13 - 39 U/L Final     Comment:     " Specimen collection should occur prior to Sulfasalazine administration due to the potential for falsely depressed results.    09/11/2022 23 13 - 39 U/L Final     Comment:     Specimen collection should occur prior to Sulfasalazine administration due to the potential for falsely depressed results.    11/05/2020 21 <41 U/L Final     ALT   Date Value Ref Range Status   09/12/2022 7 7 - 52 U/L Final     Comment:     Specimen collection should occur prior to Sulfasalazine administration due to the potential for falsely depressed results.    09/11/2022 12 7 - 52 U/L Final     Comment:     Specimen collection should occur prior to Sulfasalazine administration due to the potential for falsely depressed results.    11/05/2020 18 <56 U/L Final          Adrienne Marrero MD  11/15/2024  6:03 AM

## 2024-11-15 NOTE — PLAN OF CARE
Problem: POSTPARTUM  Goal: Experiences normal postpartum course  Description: INTERVENTIONS:  - Monitor maternal vital signs  - Assess uterine involution and lochia  2024 by Margo Sykes  Outcome: Progressing  2024 by Margo Sykes  Outcome: Progressing  Goal: Appropriate maternal -  bonding  Description: INTERVENTIONS:  - Identify family support  - Assess for appropriate maternal/infant bonding   -Encourage maternal/infant bonding opportunities  - Referral to  or  as needed  2024 by Margo ySkes  Outcome: Progressing  2024 by Margo Sykes  Outcome: Progressing  Goal: Establishment of infant feeding pattern  Description: INTERVENTIONS:  - Assess breast/bottle feeding  - Refer to lactation as needed  2024 by Margo Sykes  Outcome: Progressing  2024 by Margo Sykes  Outcome: Progressing  Goal: Incision(s), wounds(s) or drain site(s) healing without S/S of infection  Description: INTERVENTIONS  - Assess and document dressing, incision, wound bed, drain sites and surrounding tissue  - Provide patient and family education    2024 by Margo Sykes  Outcome: Progressing  2024 by Margo Sykes  Outcome: Progressing     Problem: PAIN - ADULT  Goal: Verbalizes/displays adequate comfort level or baseline comfort level  Description: Interventions:  - Encourage patient to monitor pain and request assistance  - Assess pain using appropriate pain scale  - Administer analgesics based on type and severity of pain and evaluate response  - Implement non-pharmacological measures as appropriate and evaluate response  - Consider cultural and social influences on pain and pain management  - Notify physician/advanced practitioner if interventions unsuccessful or patient reports new pain  2024 by Margo Sykes  Outcome: Progressing  2024 by Margo  Mony  Outcome: Progressing     Problem: INFECTION - ADULT  Goal: Absence or prevention of progression during hospitalization  Description: INTERVENTIONS:  - Assess and monitor for signs and symptoms of infection  - Monitor lab/diagnostic results  - Monitor all insertion sites, i.e. indwelling lines, tubes, and drains  - Monitor endotracheal if appropriate and nasal secretions for changes in amount and color  - [place] appropriate cooling/warming therapies per order  - Administer medications as ordered  - Instruct and encourage patient and family to use good hand hygiene technique  - Identify and instruct in appropriate isolation precautions for identified infection/condition  11/14/2024 2155 by Margo Sykes  Outcome: Progressing  11/14/2024 2153 by Margo Sykes  Outcome: Progressing  Goal: Absence of fever/infection during neutropenic period  Description: INTERVENTIONS:  - Monitor WBC    11/14/2024 2155 by Margo Sykes  Outcome: Progressing  11/14/2024 2153 by Margo Sykes  Outcome: Progressing     Problem: SAFETY ADULT  Goal: Patient will remain free of falls  Description: INTERVENTIONS:  - Instruct patient to call for assistance with activity   - Keep Call bell within reach  - Keep bed low and locked with side rails adjusted as appropriate  - Keep care items and personal belongings within reach  - Initiate and maintain comfort rounds  11/14/2024 2155 by Margo Sykes  Outcome: Progressing  11/14/2024 2153 by Margo Sykes  Outcome: Progressing  Goal: Maintain or return to baseline ADL function  Description: INTERVENTIONS:  - Provide patient education as appropriate  11/14/2024 2155 by Margo Sykes  Outcome: Progressing  11/14/2024 2153 by Margo Sykes  Outcome: Progressing  Goal: Maintains/Returns to pre admission functional level  Description: INTERVENTIONS:  - Record patient progress and toleration of activity level   11/14/2024 2155 by Margo Sykes  Outcome:  Progressing  11/14/2024 2153 by Margo Sykes  Outcome: Progressing     Problem: DISCHARGE PLANNING  Goal: Discharge to home or other facility with appropriate resources  Description: INTERVENTIONS:  - Identify barriers to discharge w/patient and caregiver  - Arrange for needed discharge resources and transportation as appropriate  - Identify discharge learning needs (meds, wound care, etc.)  - Arrange for interpretive services to assist at discharge as needed  - Refer to Case Management Department for coordinating discharge planning if the patient needs post-hospital services based on physician/advanced practitioner order or complex needs related to functional status, cognitive ability, or social support system  11/14/2024 2155 by Margo Sykes  Outcome: Progressing  11/14/2024 2153 by Margo Sykes  Outcome: Progressing     Problem: Knowledge Deficit  Goal: Patient/family/caregiver demonstrates understanding of disease process, treatment plan, medications, and discharge instructions  Description: Complete learning assessment and assess knowledge base.  Interventions:  - Provide teaching at level of understanding  - Provide teaching via preferred learning methods  11/14/2024 2155 by Margo Sykes  Outcome: Progressing  11/14/2024 2153 by Margo Sykes  Outcome: Progressing

## 2024-11-15 NOTE — PLAN OF CARE
Problem: POSTPARTUM  Goal: Experiences normal postpartum course  Description: INTERVENTIONS:  - Monitor maternal vital signs  - Assess uterine involution and lochia  Outcome: Progressing  Goal: Appropriate maternal -  bonding  Description: INTERVENTIONS:  - Identify family support  - Assess for appropriate maternal/infant bonding   -Encourage maternal/infant bonding opportunities  - Referral to  or  as needed  Outcome: Progressing  Goal: Establishment of infant feeding pattern  Description: INTERVENTIONS:  - Assess breast/bottle feeding  - Refer to lactation as needed  Outcome: Progressing  Goal: Incision(s), wounds(s) or drain site(s) healing without S/S of infection  Description: INTERVENTIONS  - Assess and document dressing, incision, wound bed, drain sites and surrounding tissue  - Provide patient and family education    Outcome: Progressing     Problem: PAIN - ADULT  Goal: Verbalizes/displays adequate comfort level or baseline comfort level  Description: Interventions:  - Encourage patient to monitor pain and request assistance  - Assess pain using appropriate pain scale  - Administer analgesics based on type and severity of pain and evaluate response  - Implement non-pharmacological measures as appropriate and evaluate response  - Consider cultural and social influences on pain and pain management  - Notify physician/advanced practitioner if interventions unsuccessful or patient reports new pain  Outcome: Progressing     Problem: INFECTION - ADULT  Goal: Absence or prevention of progression during hospitalization  Description: INTERVENTIONS:  - Assess and monitor for signs and symptoms of infection  - Monitor lab/diagnostic results  - Monitor all insertion sites, i.e. indwelling lines, tubes, and drains  - Monitor endotracheal if appropriate and nasal secretions for changes in amount and color  - [place] appropriate cooling/warming therapies per order  - Administer  medications as ordered  - Instruct and encourage patient and family to use good hand hygiene technique  - Identify and instruct in appropriate isolation precautions for identified infection/condition  Outcome: Progressing  Goal: Absence of fever/infection during neutropenic period  Description: INTERVENTIONS:  - Monitor WBC    Outcome: Progressing     Problem: SAFETY ADULT  Goal: Patient will remain free of falls  Description: INTERVENTIONS:  - Instruct patient to call for assistance with activity   - Keep Call bell within reach  - Keep bed low and locked with side rails adjusted as appropriate  - Keep care items and personal belongings within reach  - Initiate and maintain comfort rounds  Outcome: Progressing  Goal: Maintain or return to baseline ADL function  Description: INTERVENTIONS:  - Provide patient education as appropriate  Outcome: Progressing  Goal: Maintains/Returns to pre admission functional level  Description: INTERVENTIONS:  - Record patient progress and toleration of activity level   Outcome: Progressing     Problem: DISCHARGE PLANNING  Goal: Discharge to home or other facility with appropriate resources  Description: INTERVENTIONS:  - Identify barriers to discharge w/patient and caregiver  - Arrange for needed discharge resources and transportation as appropriate  - Identify discharge learning needs (meds, wound care, etc.)  - Arrange for interpretive services to assist at discharge as needed  - Refer to Case Management Department for coordinating discharge planning if the patient needs post-hospital services based on physician/advanced practitioner order or complex needs related to functional status, cognitive ability, or social support system  Outcome: Progressing     Problem: Knowledge Deficit  Goal: Patient/family/caregiver demonstrates understanding of disease process, treatment plan, medications, and discharge instructions  Description: Complete learning assessment and assess knowledge  base.  Interventions:  - Provide teaching at level of understanding  - Provide teaching via preferred learning methods  Outcome: Progressing

## 2024-11-16 ENCOUNTER — APPOINTMENT (INPATIENT)
Dept: CT IMAGING | Facility: HOSPITAL | Age: 19
DRG: 540 | End: 2024-11-16
Payer: COMMERCIAL

## 2024-11-16 LAB
ABO GROUP BLD: NORMAL
ALBUMIN SERPL BCG-MCNC: 2.2 G/DL (ref 3.5–5)
ALP SERPL-CCNC: 175 U/L (ref 34–104)
ALT SERPL W P-5'-P-CCNC: 17 U/L (ref 7–52)
ANION GAP SERPL CALCULATED.3IONS-SCNC: 6 MMOL/L (ref 4–13)
AST SERPL W P-5'-P-CCNC: 31 U/L (ref 13–39)
BILIRUB SERPL-MCNC: 0.23 MG/DL (ref 0.2–1)
BLD GP AB SCN SERPL QL: NEGATIVE
BUN SERPL-MCNC: 10 MG/DL (ref 5–25)
CALCIUM ALBUM COR SERPL-MCNC: 9.1 MG/DL (ref 8.3–10.1)
CALCIUM SERPL-MCNC: 7.7 MG/DL (ref 8.4–10.2)
CHLORIDE SERPL-SCNC: 109 MMOL/L (ref 96–108)
CO2 SERPL-SCNC: 22 MMOL/L (ref 21–32)
CREAT SERPL-MCNC: 1.22 MG/DL (ref 0.6–1.3)
DME PARACHUTE DELIVERY DATE ACTUAL: NORMAL
DME PARACHUTE DELIVERY DATE REQUESTED: NORMAL
DME PARACHUTE ITEM DESCRIPTION: NORMAL
DME PARACHUTE ORDER STATUS: NORMAL
DME PARACHUTE SUPPLIER NAME: NORMAL
DME PARACHUTE SUPPLIER PHONE: NORMAL
ERYTHROCYTE [DISTWIDTH] IN BLOOD BY AUTOMATED COUNT: 17.2 % (ref 11.6–15.1)
GFR SERPL CREATININE-BSD FRML MDRD: 63 ML/MIN/1.73SQ M
GLUCOSE SERPL-MCNC: 102 MG/DL (ref 65–140)
HCT VFR BLD AUTO: 15.6 % (ref 34.8–46.1)
HGB BLD-MCNC: 5.2 G/DL (ref 11.5–15.4)
MCH RBC QN AUTO: 28.1 PG (ref 26.8–34.3)
MCHC RBC AUTO-ENTMCNC: 33.3 G/DL (ref 31.4–37.4)
MCV RBC AUTO: 84 FL (ref 82–98)
PLATELET # BLD AUTO: 273 THOUSANDS/UL (ref 149–390)
PMV BLD AUTO: 10.6 FL (ref 8.9–12.7)
POTASSIUM SERPL-SCNC: 4.4 MMOL/L (ref 3.5–5.3)
PROT SERPL-MCNC: 4.7 G/DL (ref 6.4–8.4)
RBC # BLD AUTO: 1.85 MILLION/UL (ref 3.81–5.12)
RH BLD: POSITIVE
SODIUM SERPL-SCNC: 137 MMOL/L (ref 135–147)
SPECIMEN EXPIRATION DATE: NORMAL
WBC # BLD AUTO: 16.75 THOUSAND/UL (ref 4.31–10.16)

## 2024-11-16 PROCEDURE — 99024 POSTOP FOLLOW-UP VISIT: CPT | Performed by: OBSTETRICS & GYNECOLOGY

## 2024-11-16 PROCEDURE — 86901 BLOOD TYPING SEROLOGIC RH(D): CPT

## 2024-11-16 PROCEDURE — 74178 CT ABD&PLV WO CNTR FLWD CNTR: CPT

## 2024-11-16 PROCEDURE — 86900 BLOOD TYPING SEROLOGIC ABO: CPT

## 2024-11-16 PROCEDURE — 30233N1 TRANSFUSION OF NONAUTOLOGOUS RED BLOOD CELLS INTO PERIPHERAL VEIN, PERCUTANEOUS APPROACH: ICD-10-PCS | Performed by: OBSTETRICS & GYNECOLOGY

## 2024-11-16 PROCEDURE — P9016 RBC LEUKOCYTES REDUCED: HCPCS

## 2024-11-16 PROCEDURE — 80053 COMPREHEN METABOLIC PANEL: CPT

## 2024-11-16 PROCEDURE — 86850 RBC ANTIBODY SCREEN: CPT

## 2024-11-16 PROCEDURE — 85027 COMPLETE CBC AUTOMATED: CPT

## 2024-11-16 PROCEDURE — 86923 COMPATIBILITY TEST ELECTRIC: CPT

## 2024-11-16 RX ORDER — ACETAMINOPHEN 325 MG/1
650 TABLET ORAL ONCE
Status: DISCONTINUED | OUTPATIENT
Start: 2024-11-16 | End: 2024-11-17 | Stop reason: HOSPADM

## 2024-11-16 RX ORDER — DIPHENHYDRAMINE HCL 25 MG
25 TABLET ORAL ONCE
Status: COMPLETED | OUTPATIENT
Start: 2024-11-16 | End: 2024-11-16

## 2024-11-16 RX ADMIN — IBUPROFEN 600 MG: 600 TABLET, FILM COATED ORAL at 16:15

## 2024-11-16 RX ADMIN — IBUPROFEN 600 MG: 600 TABLET, FILM COATED ORAL at 21:55

## 2024-11-16 RX ADMIN — ACETAMINOPHEN 650 MG: 325 TABLET, FILM COATED ORAL at 09:21

## 2024-11-16 RX ADMIN — ONDANSETRON 4 MG: 2 INJECTION INTRAMUSCULAR; INTRAVENOUS at 10:07

## 2024-11-16 RX ADMIN — IBUPROFEN 600 MG: 600 TABLET, FILM COATED ORAL at 09:21

## 2024-11-16 RX ADMIN — OXYCODONE 5 MG: 5 TABLET ORAL at 03:51

## 2024-11-16 RX ADMIN — IOHEXOL 100 ML: 350 INJECTION, SOLUTION INTRAVENOUS at 11:07

## 2024-11-16 RX ADMIN — DIPHENHYDRAMINE HYDROCHLORIDE 25 MG: 25 TABLET ORAL at 16:15

## 2024-11-16 RX ADMIN — OXYCODONE 5 MG: 5 TABLET ORAL at 20:31

## 2024-11-16 RX ADMIN — ACETAMINOPHEN 650 MG: 325 TABLET, FILM COATED ORAL at 03:51

## 2024-11-16 RX ADMIN — OXYCODONE 5 MG: 5 TABLET ORAL at 09:21

## 2024-11-16 NOTE — QUICK NOTE
S:   Patient's CBC this morning s/f HgB 5.2 g/dL, significant drop from two days ago at 6.2 g/dL. She had declined pRBC at that time, due to being asymptomatic. However, this AM she reported dizziness so repeat HgB was drawn. On evaluation at bedside after these results, patient reports feeling dizzy and short of breath. She also reports significant abdominal pain, though this has been her baseline since her surgery. She is otherwise voiding spontaneously and passing gas.     O:  Vitals:    24 0654   BP: 113/70   Pulse: 80   Resp: 16   Temp: 98 °F (36.7 °C)   SpO2: 96%      Gen: Appears uncomfortable, no acute distress  Cardiac: RRR  Pulm: CTAB  Abd: Incision c/d/I, appears distended and globally tender to palpation, no rebound or guarding  Ext: Non-tender, no edema    A/P:   18 yo  POD2 s/p 1LTCS c/b hemorrhage (QBL 1367 mL), now with worsening anemia.     - Transfuse 2u pRBC, consents obtained  - F/u post-transfusion CBC and AM CBC  - STAT CT a/p to evaluate for continued bleeding    D/w Dr. Sharpe.     Olive Hoover MD  OBGYN PGY2

## 2024-11-16 NOTE — PLAN OF CARE
Problem: POSTPARTUM  Goal: Experiences normal postpartum course  Description: INTERVENTIONS:  - Monitor maternal vital signs  - Assess uterine involution and lochia  Outcome: Progressing  Goal: Appropriate maternal -  bonding  Description: INTERVENTIONS:  - Identify family support  - Assess for appropriate maternal/infant bonding   -Encourage maternal/infant bonding opportunities  - Referral to  or  as needed  Outcome: Progressing  Goal: Establishment of infant feeding pattern  Description: INTERVENTIONS:  - Assess breast/bottle feeding  - Refer to lactation as needed  Outcome: Progressing  Goal: Incision(s), wounds(s) or drain site(s) healing without S/S of infection  Description: INTERVENTIONS  - Assess and document dressing, incision, wound bed, drain sites and surrounding tissue  - Provide patient and family education    Outcome: Progressing     Problem: PAIN - ADULT  Goal: Verbalizes/displays adequate comfort level or baseline comfort level  Description: Interventions:  - Encourage patient to monitor pain and request assistance  - Assess pain using appropriate pain scale  - Administer analgesics based on type and severity of pain and evaluate response  - Implement non-pharmacological measures as appropriate and evaluate response  - Consider cultural and social influences on pain and pain management  - Notify physician/advanced practitioner if interventions unsuccessful or patient reports new pain  Outcome: Progressing     Problem: INFECTION - ADULT  Goal: Absence or prevention of progression during hospitalization  Description: INTERVENTIONS:  - Assess and monitor for signs and symptoms of infection  - Monitor lab/diagnostic results  - Monitor all insertion sites, i.e. indwelling lines, tubes, and drains  - Monitor endotracheal if appropriate and nasal secretions for changes in amount and color  - [place] appropriate cooling/warming therapies per order  - Administer  medications as ordered  - Instruct and encourage patient and family to use good hand hygiene technique  - Identify and instruct in appropriate isolation precautions for identified infection/condition  Outcome: Progressing  Goal: Absence of fever/infection during neutropenic period  Description: INTERVENTIONS:  - Monitor WBC    Outcome: Progressing     Problem: SAFETY ADULT  Goal: Patient will remain free of falls  Description: INTERVENTIONS:  - Instruct patient to call for assistance with activity   - Keep Call bell within reach  - Keep bed low and locked with side rails adjusted as appropriate  - Keep care items and personal belongings within reach  - Initiate and maintain comfort rounds  Outcome: Progressing  Goal: Maintain or return to baseline ADL function  Description: INTERVENTIONS:  - Provide patient education as appropriate  Outcome: Progressing  Goal: Maintains/Returns to pre admission functional level  Description: INTERVENTIONS:  - Record patient progress and toleration of activity level   Outcome: Progressing     Problem: DISCHARGE PLANNING  Goal: Discharge to home or other facility with appropriate resources  Description: INTERVENTIONS:  - Identify barriers to discharge w/patient and caregiver  - Arrange for needed discharge resources and transportation as appropriate  - Identify discharge learning needs (meds, wound care, etc.)  - Arrange for interpretive services to assist at discharge as needed  - Refer to Case Management Department for coordinating discharge planning if the patient needs post-hospital services based on physician/advanced practitioner order or complex needs related to functional status, cognitive ability, or social support system  Outcome: Progressing     Problem: Knowledge Deficit  Goal: Patient/family/caregiver demonstrates understanding of disease process, treatment plan, medications, and discharge instructions  Description: Complete learning assessment and assess knowledge  base.  Interventions:  - Provide teaching at level of understanding  - Provide teaching via preferred learning methods  Outcome: Progressing

## 2024-11-16 NOTE — PLAN OF CARE
Problem: POSTPARTUM  Goal: Experiences normal postpartum course  Description: INTERVENTIONS:  - Monitor maternal vital signs  - Assess uterine involution and lochia  Outcome: Progressing  Goal: Appropriate maternal -  bonding  Description: INTERVENTIONS:  - Identify family support  - Assess for appropriate maternal/infant bonding   -Encourage maternal/infant bonding opportunities  - Referral to  or  as needed  Outcome: Progressing  Goal: Establishment of infant feeding pattern  Description: INTERVENTIONS:  - Assess breast/bottle feeding  - Refer to lactation as needed  Outcome: Progressing  Goal: Incision(s), wounds(s) or drain site(s) healing without S/S of infection  Description: INTERVENTIONS  - Assess and document dressing, incision, wound bed, drain sites and surrounding tissue  - Provide patient and family education    Outcome: Progressing     Problem: PAIN - ADULT  Goal: Verbalizes/displays adequate comfort level or baseline comfort level  Description: Interventions:  - Encourage patient to monitor pain and request assistance  - Assess pain using appropriate pain scale  - Administer analgesics based on type and severity of pain and evaluate response  - Implement non-pharmacological measures as appropriate and evaluate response  - Consider cultural and social influences on pain and pain management  - Notify physician/advanced practitioner if interventions unsuccessful or patient reports new pain  Outcome: Progressing     Problem: INFECTION - ADULT  Goal: Absence or prevention of progression during hospitalization  Description: INTERVENTIONS:  - Assess and monitor for signs and symptoms of infection  - Monitor lab/diagnostic results  - Monitor all insertion sites, i.e. indwelling lines, tubes, and drains  - Monitor endotracheal if appropriate and nasal secretions for changes in amount and color  - [place] appropriate cooling/warming therapies per order  - Administer  medications as ordered  - Instruct and encourage patient and family to use good hand hygiene technique  - Identify and instruct in appropriate isolation precautions for identified infection/condition  Outcome: Progressing  Goal: Absence of fever/infection during neutropenic period  Description: INTERVENTIONS:  - Monitor WBC    Outcome: Progressing     Problem: SAFETY ADULT  Goal: Patient will remain free of falls  Description: INTERVENTIONS:  - Instruct patient to call for assistance with activity   - Keep Call bell within reach  - Keep bed low and locked with side rails adjusted as appropriate  - Keep care items and personal belongings within reach  - Initiate and maintain comfort rounds  Outcome: Progressing  Goal: Maintain or return to baseline ADL function  Description: INTERVENTIONS:  - Provide patient education as appropriate  Outcome: Progressing  Goal: Maintains/Returns to pre admission functional level  Description: INTERVENTIONS:  - Record patient progress and toleration of activity level   Outcome: Progressing     Problem: DISCHARGE PLANNING  Goal: Discharge to home or other facility with appropriate resources  Description: INTERVENTIONS:  - Identify barriers to discharge w/patient and caregiver  - Arrange for needed discharge resources and transportation as appropriate  - Identify discharge learning needs (meds, wound care, etc.)  - Arrange for interpretive services to assist at discharge as needed  - Refer to Case Management Department for coordinating discharge planning if the patient needs post-hospital services based on physician/advanced practitioner order or complex needs related to functional status, cognitive ability, or social support system  Outcome: Progressing     Problem: Knowledge Deficit  Goal: Patient/family/caregiver demonstrates understanding of disease process, treatment plan, medications, and discharge instructions  Description: Complete learning assessment and assess knowledge  base.  Interventions:  - Provide teaching at level of understanding  - Provide teaching via preferred learning methods  Outcome: Progressing

## 2024-11-16 NOTE — LACTATION NOTE
This note was copied from a baby's chart.  Follow up Lactation: Cheyenne has Nenita s2s in between her breasts. She is at a 45* in the bed. Cheyenne states breastfeeding is going well. Cheyenne has a small bruise on the face of the right nipple.     Ed. On shallow latch.    Nenita is beginning to demonstrate early feeding cues.     Reviewed hand expression and provided hands on demonstration. Visible colostrum with noticeable changes in viscosity and color. Encouragement and reassurance provided for milk supply.    [place] Latch After Lactation Education/Consult:  Efficiency: laid back on L for 10 min. Then R for 30 min.              Lips Flanged: Yes, with additional support and how to flange lips on the right nipple due to lack of compression between the shoulder blades with initial latch on the right breast. Cheyenne is cautious to hold Nenita snugly  to the breast.              Depth of latch: deep with hands on demonstration              Audible Swallow: Yes              Visible Milk: Yes, with HE              Wide Open/ Asymmetrical: Yes, enc. Nipple to nose, chin to breast, and  how to achieve a deep latch              Suck Swallow Cycle: Breathing: yes, Coordinated: yes  Nipple Assessment after latch: Normal: elongated/eraser, no discoloration and no damage noted.  Latch Problems: hand over hand demonstration and teach back of alignment, chin to the breast to achieve an asymmetrical latch, and how to est. Milk supply. Ed. On signs of satiation and timing of feeds.    Position After Lactation Education/Consult:  Infant's Ergonomics/Body               Body Alignment: Yes, ear, shoulder, hip aligned. Enc. Compression between the shoulder blades. Ed. On how babies breathe at the breast               Head Supported: Yes, with demonstration of hand placement at occipital output and supporting lower mandible               Close to Mom's body/ Lifted/ Supported: Yes, belly to belly               Mom's Ergonomics/Body: Yes,  "pillows under her arms - enc. Nipple to nose                           Supported: Yes                           Sitting Back: Yes                           Brings Baby to her breast: Yes, with hands on demonstration  Positioning Problems: none with demonstration, some with teach back on the right breast (non-dominate hand supporting baby at the breast)    Feeding Plan:     Education on positioning and alignment. Mom is encouraged to:     - Bring baby up to the breast (use of pillows to elevate so baby's torso is against mom's breasts)   - Skin to skin for feedings with top hand exposed to show signs of satiation   - Chin deep into breast tissue (make baby look up to the nipple)   - nose aligned to the nipple   -Wait for wide gape, drag chin on the breast so nipple is aimed at the upper, back palate  - Cheek should be touching breast   - Deep, firm hold of baby with ear, shoulder, hip alignment    Education on creating a snug hold of your infant to the breast by verifying the infant's cheek is touching the breast, your infant's chin is deep into the breast tissue, your infant's arms are \"hugging\" the breast, and your infant's lips are flanged on the areola. Bring infant to the breast, not your breast to the infant. Latch should feel like a tugging sensation on the nipple.    Demonstrated with teach back breast compressions during a feeding to increase milk transfer and stimulate suckling after a breathing/muscle break.     Nurse on demand: when baby gives hunger cues; when your breasts feel full, or at least every 3 hours during the day and every 5 hours at night counting from the beginning of one feeding to the beginning of the next; which ever comes first. When sucking and swallowing slow, gently compress the breast to restart flow. If active suck-swallow does not restart, gently remove the baby and offer the other breast; offering up to \"four\" breasts per feeding.    All babies are born to breastfeed due to " upturned nose and flared nostrils. Mom will always see tip of nose. Babies will unlatch when they can't breathe.     Reviewed early signs of hunger, including tensing of hands and shoulders - no need to wait for open eyes.  Crying is a late hunger sign.  If baby is crying, soothe baby first and then attempt to latch.  Reviewed normal sucking patterns: transition from stimulation to nutritive to release or non-nutritive. The goal is to see and hear lots of swallowing.  Reviewed normal nursing pattern: infant could latch on one breast up to 30 minutes or until releases on own. Signs of satiation is open hand with fingers that do not grab your finger.  Discussed difference in sensation of non-nutritive v nutritive sucking    - Start feedings on breast that last feeding ended   - allow no more than 3 hours between breast feeding sessions   - time between feedings is counted from the beginning of the first feed to the beginning of the next feeding session     [place] pump  Begin pumping at Level 1 with suction low. When mom sees milk in the tunnel,   Change to            Level 3 with suction high. When mom doesn't see milk,   Change to            Level 2 with suction either High or Low.  When mom sees milk,   Change to            Level 3 again. Do this at least 3 x in a pumping session     Enc. To end all pumping sessions with hand pump and feel for full glands.      (Scan QR code for [employer] - positions)   Review [employer] on youtube or scan QR code for [employer] video      [employer]        [employer] - positions

## 2024-11-16 NOTE — ASSESSMENT & PLAN NOTE
FOB not involved so patient is unaware if he has any type of inherited anemia.  Hgb 9.8 on admission -> 6.8 -> 6.4 -> 6.2, s/p venofer infusion  Patient asymptomatic

## 2024-11-16 NOTE — PROGRESS NOTES
Progress Note - OB/GYN   Name: Yury Zaman 19 y.o. female I MRN: [id number]  Unit/Bed#: -01 I Date of Admission: 2024   Date of Service: 2024 I Hospital Day: 5     Assessment & Plan  At risk for sexually transmitted disease due to unprotected sex  Gonorrhea and Chlamydia testing neg  HIV, hep B, hep C neg  S/P primary low transverse   Continue routine post partum care  [id number], HgB 9.8 -> 6.8 -> 6.4 > 6.2, s/p venofer  Pain well controlled: tylenol/motrin scheduled, [place] prn  Lochia within normal limits: continue to monitor   OOB: as able, encourage ambulation  DVT ppx: SCDs  Follow up CBC on   Acute blood loss as cause of postoperative anemia  FOB not involved so patient is unaware if he has any type of inherited anemia.  Hgb 9.8 on admission -> 6.8 -> 6.4 -> 6.2, s/p venofer infusion  Patient asymptomatic  Gestational hypertension  Met criteria intrapartum  CBC wnl  CMP pending    Disposition: Anticipate discharge home postpartum Day #4  Barriers to discharge: ongoing couplet care      Subjective/Objective     Subjective: Postpartum state    Pain: no  Tolerating PO: yes  Voiding: yes  Flatus: yes  BM: no  Ambulating: yes  Breastfeeding: Breastfeeding  Chest pain: no  Shortness of breath: no  Leg pain: no  Lochia: minimal    Objective:     Vitals:  Vitals:    11/15/24 0631 11/15/24 1541 11/15/24 2334 24 0654   BP: 112/79 106/66 107/72 113/70   BP Location: Right arm Right arm Left arm Right arm   Pulse: 79 86 86 80   Resp: 16 18 (!) 24 16   Temp: (!) 97.1 °F (36.2 °C) (!) 97.1 °F (36.2 °C) 98.5 °F (36.9 °C) 98 °F (36.7 °C)   TempSrc: Temporal Oral Oral Oral   SpO2: 98% 95% 97% 96%   Weight:       Height:           Physical Exam:   GEN: appears well, alert and oriented x 3, pleasant and cooperative   CV: Regular rate and rhythm  RESP: non labored breathing, lungs clear to auscultation  ABDOMEN: soft, no tenderness, no distention, Uterine fundus firm and  non-tender, +1 cm above the umbilicus, incision c/d/i  EXTREMITIES: non-tender  NEURO Alert and oriented to person, place, and time.       Lab Results   Component Value Date    WBC 16.75 (H) 2024    HGB 5.2 (LL) 2024    HCT 15.6 (L) 2024    MCV 84 2024     2024         ERIN Sharpe MD  Obstetrics & Gynecology  24       Pt seen and examined.     CT scan done for drop in Hb.  NO active bleed noted.     CT showed:   IMPRESSION:     1. Postsurgical change from recent  section with small foci of air and blood products within the endometrial cavity as well as small hyperdense blood products in the left adnexa. No active extravasation of contrast.  2. No CT evidence of active high-volume gastrointestinal hemorrhage.  3. Trace bilateral pleural effusions with bibasilar atelectasis.    Hb-5.2, for 2u pRBC.  For repeat CBC post transfusion.  Should also get IVFe tomorrow.     Dispo:  likely d/c home POD # 4.       R Kaley Sharpe MD, FACOG

## 2024-11-16 NOTE — ASSESSMENT & PLAN NOTE
Continue routine post partum care  [id number], HgB 9.8 -> 6.8 -> 6.4 > 6.2, s/p venofer  Pain well controlled: tylenol/motrin scheduled, [place] prn  Lochia within normal limits: continue to monitor   OOB: as able, encourage ambulation  DVT ppx: SCDs  Follow up CBC on 11/16

## 2024-11-17 VITALS
RESPIRATION RATE: 20 BRPM | DIASTOLIC BLOOD PRESSURE: 80 MMHG | TEMPERATURE: 98.5 F | HEIGHT: 63 IN | SYSTOLIC BLOOD PRESSURE: 117 MMHG | HEART RATE: 70 BPM | WEIGHT: 138 LBS | OXYGEN SATURATION: 99 % | BODY MASS INDEX: 24.45 KG/M2

## 2024-11-17 LAB
ABO GROUP BLD BPU: NORMAL
ABO GROUP BLD BPU: NORMAL
BPU ID: NORMAL
BPU ID: NORMAL
CROSSMATCH: NORMAL
CROSSMATCH: NORMAL
ERYTHROCYTE [DISTWIDTH] IN BLOOD BY AUTOMATED COUNT: 16.2 % (ref 11.6–15.1)
HCT VFR BLD AUTO: 24.3 % (ref 34.8–46.1)
HGB BLD-MCNC: 8.3 G/DL (ref 11.5–15.4)
MCH RBC QN AUTO: 28 PG (ref 26.8–34.3)
MCHC RBC AUTO-ENTMCNC: 34.2 G/DL (ref 31.4–37.4)
MCV RBC AUTO: 82 FL (ref 82–98)
PLATELET # BLD AUTO: 300 THOUSANDS/UL (ref 149–390)
PMV BLD AUTO: 9.4 FL (ref 8.9–12.7)
RBC # BLD AUTO: 2.96 MILLION/UL (ref 3.81–5.12)
UNIT DISPENSE STATUS: NORMAL
UNIT DISPENSE STATUS: NORMAL
UNIT PRODUCT CODE: NORMAL
UNIT PRODUCT CODE: NORMAL
UNIT PRODUCT VOLUME: 350 ML
UNIT PRODUCT VOLUME: 350 ML
UNIT RH: NORMAL
UNIT RH: NORMAL
WBC # BLD AUTO: 14.96 THOUSAND/UL (ref 4.31–10.16)

## 2024-11-17 PROCEDURE — 85027 COMPLETE CBC AUTOMATED: CPT

## 2024-11-17 PROCEDURE — NC001 PR NO CHARGE: Performed by: OBSTETRICS & GYNECOLOGY

## 2024-11-17 RX ORDER — OXYCODONE HYDROCHLORIDE 5 MG/1
5 TABLET ORAL EVERY 4 HOURS PRN
Qty: 5 TABLET | Refills: 0 | Status: SHIPPED | OUTPATIENT
Start: 2024-11-17 | End: 2024-11-27

## 2024-11-17 RX ORDER — IBUPROFEN 600 MG/1
600 TABLET, FILM COATED ORAL EVERY 6 HOURS SCHEDULED
Qty: 30 TABLET | Refills: 0 | Status: SHIPPED | OUTPATIENT
Start: 2024-11-17

## 2024-11-17 RX ORDER — BENZOCAINE/MENTHOL 6 MG-10 MG
1 LOZENGE MUCOUS MEMBRANE DAILY PRN
Start: 2024-11-17

## 2024-11-17 RX ORDER — ACETAMINOPHEN 325 MG/1
650 TABLET ORAL ONCE
Qty: 2 TABLET | Refills: 0 | Status: SHIPPED | OUTPATIENT
Start: 2024-11-17 | End: 2024-11-17

## 2024-11-17 RX ADMIN — OXYCODONE 5 MG: 5 TABLET ORAL at 05:34

## 2024-11-17 RX ADMIN — IBUPROFEN 600 MG: 600 TABLET, FILM COATED ORAL at 05:34

## 2024-11-17 RX ADMIN — OXYCODONE 5 MG: 5 TABLET ORAL at 11:27

## 2024-11-17 RX ADMIN — IBUPROFEN 600 MG: 600 TABLET, FILM COATED ORAL at 11:27

## 2024-11-17 NOTE — ASSESSMENT & PLAN NOTE
Continue routine post partum care  [id number], HgB 9.8 -> 6.8 -> 6.4 > 6.2, s/p venofer > 5.2 > 2u pRBC > 8.3  Pain well controlled: tylenol/motrin scheduled, [place] prn  Lochia within normal limits: continue to monitor   OOB: as able, encourage ambulation  DVT ppx: SCDs  Anticipate d/c home POD4

## 2024-11-17 NOTE — PLAN OF CARE
Problem: POSTPARTUM  Goal: Experiences normal postpartum course  Description: INTERVENTIONS:  - Monitor maternal vital signs  - Assess uterine involution and lochia  Outcome: Progressing  Goal: Appropriate maternal -  bonding  Description: INTERVENTIONS:  - Identify family support  - Assess for appropriate maternal/infant bonding   -Encourage maternal/infant bonding opportunities  - Referral to  or  as needed  Outcome: Progressing  Goal: Establishment of infant feeding pattern  Description: INTERVENTIONS:  - Assess breast/bottle feeding  - Refer to lactation as needed  Outcome: Progressing  Goal: Incision(s), wounds(s) or drain site(s) healing without S/S of infection  Description: INTERVENTIONS  - Assess and document dressing, incision, wound bed, drain sites and surrounding tissue  - Provide patient and family education    Outcome: Progressing     Problem: PAIN - ADULT  Goal: Verbalizes/displays adequate comfort level or baseline comfort level  Description: Interventions:  - Encourage patient to monitor pain and request assistance  - Assess pain using appropriate pain scale  - Administer analgesics based on type and severity of pain and evaluate response  - Implement non-pharmacological measures as appropriate and evaluate response  - Consider cultural and social influences on pain and pain management  - Notify physician/advanced practitioner if interventions unsuccessful or patient reports new pain  Outcome: Progressing     Problem: INFECTION - ADULT  Goal: Absence or prevention of progression during hospitalization  Description: INTERVENTIONS:  - Assess and monitor for signs and symptoms of infection  - Monitor lab/diagnostic results  - Monitor all insertion sites, i.e. indwelling lines, tubes, and drains  - Monitor endotracheal if appropriate and nasal secretions for changes in amount and color  - [place] appropriate cooling/warming therapies per order  - Administer  medications as ordered  - Instruct and encourage patient and family to use good hand hygiene technique  - Identify and instruct in appropriate isolation precautions for identified infection/condition  Outcome: Progressing  Goal: Absence of fever/infection during neutropenic period  Description: INTERVENTIONS:  - Monitor WBC    Outcome: Progressing     Problem: SAFETY ADULT  Goal: Patient will remain free of falls  Description: INTERVENTIONS:  - Instruct patient to call for assistance with activity   - Keep Call bell within reach  - Keep bed low and locked with side rails adjusted as appropriate  - Keep care items and personal belongings within reach  - Initiate and maintain comfort rounds  Outcome: Progressing  Goal: Maintain or return to baseline ADL function  Description: INTERVENTIONS:  - Provide patient education as appropriate  Outcome: Progressing  Goal: Maintains/Returns to pre admission functional level  Description: INTERVENTIONS:  - Record patient progress and toleration of activity level   Outcome: Progressing     Problem: DISCHARGE PLANNING  Goal: Discharge to home or other facility with appropriate resources  Description: INTERVENTIONS:  - Identify barriers to discharge w/patient and caregiver  - Arrange for needed discharge resources and transportation as appropriate  - Identify discharge learning needs (meds, wound care, etc.)  - Arrange for interpretive services to assist at discharge as needed  - Refer to Case Management Department for coordinating discharge planning if the patient needs post-hospital services based on physician/advanced practitioner order or complex needs related to functional status, cognitive ability, or social support system  Outcome: Progressing     Problem: Knowledge Deficit  Goal: Patient/family/caregiver demonstrates understanding of disease process, treatment plan, medications, and discharge instructions  Description: Complete learning assessment and assess knowledge  base.  Interventions:  - Provide teaching at level of understanding  - Provide teaching via preferred learning methods  Outcome: Progressing

## 2024-11-17 NOTE — PLAN OF CARE
Problem: POSTPARTUM  Goal: Experiences normal postpartum course  Description: INTERVENTIONS:  - Monitor maternal vital signs  - Assess uterine involution and lochia  Outcome: Progressing  Goal: Appropriate maternal -  bonding  Description: INTERVENTIONS:  - Identify family support  - Assess for appropriate maternal/infant bonding   -Encourage maternal/infant bonding opportunities  - Referral to  or  as needed  Outcome: Progressing  Goal: Establishment of infant feeding pattern  Description: INTERVENTIONS:  - Assess breast/bottle feeding  - Refer to lactation as needed  Outcome: Progressing  Goal: Incision(s), wounds(s) or drain site(s) healing without S/S of infection  Description: INTERVENTIONS  - Assess and document dressing, incision, wound bed, drain sites and surrounding tissue  - Provide patient and family education    Outcome: Progressing     Problem: PAIN - ADULT  Goal: Verbalizes/displays adequate comfort level or baseline comfort level  Description: Interventions:  - Encourage patient to monitor pain and request assistance  - Assess pain using appropriate pain scale  - Administer analgesics based on type and severity of pain and evaluate response  - Implement non-pharmacological measures as appropriate and evaluate response  - Consider cultural and social influences on pain and pain management  - Notify physician/advanced practitioner if interventions unsuccessful or patient reports new pain  Outcome: Progressing     Problem: INFECTION - ADULT  Goal: Absence or prevention of progression during hospitalization  Description: INTERVENTIONS:  - Assess and monitor for signs and symptoms of infection  - Monitor lab/diagnostic results  - Monitor all insertion sites, i.e. indwelling lines, tubes, and drains  - Monitor endotracheal if appropriate and nasal secretions for changes in amount and color  - [place] appropriate cooling/warming therapies per order  - Administer  medications as ordered  - Instruct and encourage patient and family to use good hand hygiene technique  - Identify and instruct in appropriate isolation precautions for identified infection/condition  Outcome: Progressing  Goal: Absence of fever/infection during neutropenic period  Description: INTERVENTIONS:  - Monitor WBC    Outcome: Progressing     Problem: SAFETY ADULT  Goal: Patient will remain free of falls  Description: INTERVENTIONS:  - Instruct patient to call for assistance with activity   - Keep Call bell within reach  - Keep bed low and locked with side rails adjusted as appropriate  - Keep care items and personal belongings within reach  - Initiate and maintain comfort rounds  Outcome: Progressing  Goal: Maintain or return to baseline ADL function  Description: INTERVENTIONS:  - Provide patient education as appropriate  Outcome: Progressing  Goal: Maintains/Returns to pre admission functional level  Description: INTERVENTIONS:  - Record patient progress and toleration of activity level   Outcome: Progressing     Problem: DISCHARGE PLANNING  Goal: Discharge to home or other facility with appropriate resources  Description: INTERVENTIONS:  - Identify barriers to discharge w/patient and caregiver  - Arrange for needed discharge resources and transportation as appropriate  - Identify discharge learning needs (meds, wound care, etc.)  - Arrange for interpretive services to assist at discharge as needed  - Refer to Case Management Department for coordinating discharge planning if the patient needs post-hospital services based on physician/advanced practitioner order or complex needs related to functional status, cognitive ability, or social support system  Outcome: Progressing     Problem: Knowledge Deficit  Goal: Patient/family/caregiver demonstrates understanding of disease process, treatment plan, medications, and discharge instructions  Description: Complete learning assessment and assess knowledge  base.  Interventions:  - Provide teaching at level of understanding  - Provide teaching via preferred learning methods  Outcome: Progressing

## 2024-11-17 NOTE — PROGRESS NOTES
Progress Note - OB/GYN   Name: Yury Zaman 19 y.o. female I MRN: [id number]  Unit/Bed#: -01 I Date of Admission: 2024   Date of Service: 2024 I Hospital Day: 6     Assessment & Plan  At risk for sexually transmitted disease due to unprotected sex  Gonorrhea and Chlamydia testing neg  HIV, hep B, hep C neg  S/P primary low transverse   Continue routine post partum care  [id number], HgB 9.8 -> 6.8 -> 6.4 > 6.2, s/p venofer > 5.2 > 2u pRBC > 8.3  Pain well controlled: tylenol/motrin scheduled, [place] prn  Lochia within normal limits: continue to monitor   OOB: as able, encourage ambulation  DVT ppx: SCDs  Anticipate d/c home POD4  Acute blood loss as cause of postoperative anemia  FOB not involved so patient is unaware if he has any type of inherited anemia.  Hgb 9.8 on admission -> 6.8 -> 6.4 -> 6.2, s/p venofer infusion  Patient asymptomatic  Gestational hypertension  Met criteria intrapartum  CBC, CMP wnl  Normotensive since 11/15  Asthma    Hemoglobin S trait (HCC)    Pyelectasis of fetus on prenatal ultrasound    Status post  delivery    Encounter for elective induction of labor    Tobacco use affecting pregnancy, antepartum      OB Post-Partum Progress Note  Subjective   Post delivery. Patient is doing well. Lochia WNL. Pain well controlled. Patient reports her dizziness and shortness of breath have resolved since her blood transfusion. She is otherwise asymptomatic.     Pain: yes, cramping, improved with meds  Tolerating PO: yes  Voiding: yes  Flatus: passing  BM: yes  Ambulating: yes  Breastfeeding:  yes  Chest pain: no  Shortness of breath: no  Leg pain: no  Lochia: WNL    Objective :  Temp:  [97.9 °F (36.6 °C)-98.9 °F (37.2 °C)] 98.7 °F (37.1 °C)  HR:  [68-85] 68  BP: (109-130)/(70-86) 124/84  Resp:  [16-18] 16  SpO2:  [96 %-100 %] 99 %  O2 Device: None (Room air)    Physical Exam  Constitutional:       Appearance: Normal appearance.   Cardiovascular:      Rate  and Rhythm: Normal rate.   Abdominal:      Comments: Incision clean/dry/intact  Minimally tender to palpation   Genitourinary:     General: Normal vulva.   Musculoskeletal:      Right lower leg: No edema.      Left lower leg: No edema.   Skin:     General: Skin is warm and dry.   Neurological:      General: No focal deficit present.      Mental Status: She is alert and oriented to person, place, and time.         Lab Results: I have reviewed the following results:  Lab Results   Component Value Date    WBC 14.96 (H) 11/17/2024    HGB 8.3 (L) 11/17/2024    HCT 24.3 (L) 11/17/2024    MCV 82 11/17/2024     11/17/2024     Olive Hoover MD  OBGYN PGY2

## 2024-11-17 NOTE — LACTATION NOTE
This note was copied from a baby's chart.  CONSULT - LACTATION  Baby Boy Zaman (Clayneashia) 4 days male MRN: [id number]    [facility] ULTRASOUND Room / Bed:  416(N)/ 416(N) Encounter: [id number]    Maternal Information     MOTHER:  Yury Zaman  Maternal Age: 19 y.o.  OB History: # 1 - Date: 24, Sex: Male, Weight: 3500 g (7 lb 11.5 oz), GA: 40w1d, Type: , Low Transverse, Apgar1: 8, Apgar5: 9, Living: Living, Birth Comments: None   Previouse breast reduction surgery? No    Lactation history:   Has patient previously breast fed: No   How long had patient previously breast fed:     Previous breast feeding complications:       Past Surgical History:   Procedure Laterality Date    DE  DELIVERY ONLY N/A 2024    Procedure:  SECTION ();  Surgeon: Yardlie Toussaint-Foster, DO;  Location: [facility];  Service: Obstetrics    SHOULDER SURGERY Right        Birth information:  YOB: 2026   Time of birth: 10:01 AM   Sex: male   Delivery type: , Low Transverse   Birth Weight: 3500 g (7 lb 11.5 oz)   Percent of Weight Change: -8%     Gestational Age: 40w1d   [unfilled]    Assessment     Breast and nipple assessment:  large breasts with everted nipples      Assessment: normal assessment    Feeding assessment: feeding well  LATCH:  Latch: Grasps breast, tongue down, lips flanged, rhythmic sucking   Audible Swallowing: Spontaneous and intermittent (24 hours old)   Type of Nipple: Everted (After stimulation)   Comfort (Breast/Nipple): Soft/non-tender   Hold (Positioning): Partial assist, teach one side, mother does other, staff holds   LATCH Score: 9           24 0800   Lactation Consultation   Reason for Consult 20 minutes;20 min   Maternal Information   Has mother  before? No   Infant to breast within first hour of birth? No   Breastfeeding Delayed Due to Maternal status   Exclusive Pump and  Bottle Feed No   LATCH Documentation   Latch 2   Audible Swallowing 2   Type of Nipple 2   Comfort (Breast/Nipple) 2   Hold (Positioning) 1   LATCH Score 9   Having latch problems? No   Position(s) Used Laid Back;Side Lying   Breasts/Nipples   Left Breast Soft  (large breast)   Right Breast Soft  (large breast)   Left Nipple Everted   Right Nipple Everted   Intervention Lanolin;Hand expression   Breastfeeding Progress Not yet established;Breastfeeding well   Breast Pump   Pump 3;2  (Matt COLE2)   Patient Follow-Up   Lactation Consult Status 2   Follow-Up Type Inpatient;Call as needed   Other OB Lactation Documentation    Additional Problem Noted Mom has [place] in side lying position on right breast upon arrival into room. Mom transitioned to left breast in laid back. assisted with proper alignment. Baby gulping at breast. Mom feels her milk is in. Mom had questions regarding pumping. Education given. Enc to call for further assistance.     Feeding recommendations:  breast feed on demand  Mom has [place] in side lying position on right breast upon arrival into room. Mom transitioned to left breast in laid back. assisted with proper alignment. Baby gulping at breast. Mom feels her milk is in. Mom had questions regarding pumping. Education given. Enc to call for further assistance.    Demonstration and teach back of deeper latch with baby deeper into mom's axilla and baby's chest against the breast. Alignment of ear, shoulder, hip and tucked into mom's arm. Alignment of nipple to nose, once wide mouth is achieved, snug hold between the upper shoulders. Take baby to breast, not breast to baby. Active, coordinated sucking achieved. Ed. On breathing and muscle breaks. Ed. On breast compressions, timing of feeds and signs of satiation.      Pumping:   - When pumping, begin in stimulation mode (high cycle, low vacuum) until milk begins to express. Change pump to expression mode (low cycle, high vacuum). Use hands on  pumping techniques to assist with milk transfer. When milk stops expressing, change back to stimulation mode. When milk begins to flow, change to expression mode. You may cycle pump up to three times in a pumping session.  Instructions given on pumping.  Discussed when to start, frequency, different pumps available versus manual expression.    Encouraged parents to call for assistance, questions, and concerns about breastfeeding.  Extension provided.    Lisette Waite MA 11/17/2024 8:38 AM

## 2024-11-17 NOTE — ASSESSMENT & PLAN NOTE
Continue routine post partum care  [id number], HgB 9.8 -> 6.8 -> 6.4 > 6.2, s/p venofer > 5.2 > 2u pRBC > 8.3  Pain well controlled: tylenol/motrin scheduled, [place] prn  Lochia within normal limits: continue to monitor   OOB: as able, encourage ambulation  DVT ppx: SCDs  Anticipate d/c home POD4

## 2024-11-18 ENCOUNTER — TELEPHONE (OUTPATIENT)
Dept: OBGYN CLINIC | Facility: MEDICAL CENTER | Age: 19
End: 2024-11-18

## 2024-11-18 NOTE — UTILIZATION REVIEW
Discharge Summary - OB/GYN   Name: Yury Zaman 19 y.o. female I MRN: [id number]  Unit/Bed#: -01 I Date of Admission: 2024   Date of Service: 2024 I Hospital Day: 6     ADMISSION  Patient of: [facility]  Admission Date: 2024   Admitting Attending: Dr. Yardlie Toussaint-Foster*  Admitting Diagnoses:   Problem List  Patient Active Problem List  Diagnosis   Ingestion of corrosive chemical   Hx of cardiac murmur   Asthma   Encounter for supervision of high risk pregnancy due to social problem in third trimester, antepartum   Hemoglobin S trait (HCC)   Pyelectasis of fetus on prenatal ultrasound   Family history of thrombosis   Status post  delivery   Encounter for elective induction of labor   Tobacco use affecting pregnancy, antepartum   At risk for sexually transmitted disease due to unprotected sex   S/P primary low transverse    Acute blood loss as cause of postoperative anemia   Gestational hypertension          DELIVERY  Delivery Method: , Low Transverse   Delivery Date and Time: 2024 10:01 AM  Delivery Attending: Yardlie Toussaint-FosterDr. Greiss-Coult     DISCHARGE  Discharge Date: 11/15/2024  Discharge Attending: Dr. Christy  Discharge Diagnosis:   Same, Delivered     Clinical course: Admission to Delivery     Yury Zaman is a 19 y.o.  at 40w1d admitted for eIOL. A ordaz balloon was placed.  Ordaz balloon fell out and pitocin started along with an epidural . Patient progressed to complete and pushing. During second stage of labor with no descent of caput from -1 station and fetal bradycardia after pushing. The pitocin was stopped and fetal bradycardia persist after every push.  Decision was made to proceed with primary  section for arrest of descent and Cat 2 tracing.      Reason for induction: Elective.      Pt was in spontaneous labor and managed expectantly.     Induction method:  , ,Ordaz/EASI  Elective.      Delivery  Route of Delivery: , Low TransverseLTCS  Reason for  delivery: arrest of descent     Anesthesia: epiduralEpidural,   [id number]        Delivery: , Low Transverse at 2024 10:01 AM  No Laceration: none     Baby's Weight: 3500 g (7 lb 11.5 oz); 123.46    Apgar scores: 8  and 9  at 1 and 5 minutes, respectively     Clinical Course: Post-Delivery:  The post delivery course was unremarkable.     On the day of discharge, the patient was ambulating, voiding spontaneously, tolerating oral intake, and hemodynamically stable. She was able to reasonably perform all ADLs. She had appropriate bowel function. Pain was well-controlled. She was discharged home on postpartum/postop day #2 without complications. Patient was instructed to follow up with her OB as an outpatient and was given appropriate warnings to call her provider with problems or concerns.     Pertinent lab findings included:   Blood type O-.      Last three Hgb values:  Lab Results  Component Value Date    HGB 8.3 (L) 2024    HGB 5.2 (LL) 2024    HGB 6.2 (L) 2024        Problem-specific follow-up plans included the following:  Assessment & Plan  Encounter for elective induction of labor     Asthma  Avoid hemabate  Home medication ordered  Hemoglobin S trait (HCC)  FOB not involved so patient is unaware if he has any type of inherited anemia.  Hgb 9.8 on admission   2 IVs placed  Pyelectasis of fetus on prenatal ultrasound  Needs a  ultrasound at least 48 hours after birth  Status post  delivery  Admit to OBGYN   Clear liquid diet   F/u T&S, CBC, RPR   IVF LR 125cc/hr   Continuous fetal monitoring and tocometry   Analgesia at maternal request   Vertex by TAUS  Induction plan FB with pitocin    Tobacco use affecting pregnancy, antepartum  See social history  Not currently using tobacco or nicotine products   At risk for sexually transmitted disease due to unprotected sex  Patient  reports she had unprotected intercourse approximately 1 week ago and is requesting STD testing to ensure her and her baby are healthy  She denies any signs or symptoms  No lesions or abnormal discharge on admission  Gonorrhea and Chlamydia testing ordered and collected  HIV, hep B, hep C added to admission labs  S/P primary low transverse   Continue routine post partum care  [id number], HgB 9.8 -> 6.8 -> 6.4 > 6.2, s/p venofer > 5.2 > 2u pRBC > 8.3  Pain well controlled: tylenol/motrin scheduled, [place] prn  Lochia within normal limits: continue to monitor   OOB: as able, encourage ambulation  DVT ppx: SCDs  Anticipate d/c home POD4  Acute blood loss as cause of postoperative anemia  FOB not involved so patient is unaware if he has any type of inherited anemia.  Hgb 9.8 on admission -> 6.8 -> 6.4 -> 6.2, s/p venofer infusion  Patient asymptomatic  Gestational hypertension  Met criteria intrapartum  CBC, CMP wnl  Normotensive since 11/15     Discharge med list:  Contraception: undecided     Medication List      ASK your doctor about these medications     albuterol 90 mcg/act inhaler; Commonly known as: PROVENTIL HFA,VENTOLIN   HFA; Inhale 2 puffs every 6 (six) hours as needed for wheezing   ferrous sulfate 325 (65 FE) MG EC tablet; Take 1 tablet (325 mg total)   by mouth daily   omeprazole 20 mg delayed release capsule; Commonly known as: PriLOSEC;   Take 1 capsule (20 mg total) by mouth daily   Prenatal Plus 27-1 MG Tabs; Take 1 tablet by mouth daily        Condition at discharge:   good      Disposition:   Home     Planned Readmission:   No

## 2024-11-18 NOTE — TELEPHONE ENCOUNTER
POSTPARTUM PHONE CALL ASSESSMENT    Date of Delivery: 24  Delivering Provider: Dr. Toussaint-[place]  Mode:   Delivery Notes: Primary  for arrest of descent and cat ll tracing. Met criteria for gHTN intrapartum. Blood pressures have remained normotensive since 11/15.  Complications: No complications.     Do you still have bleeding/pain? Patient reports bleeding is minimal and occasional discomfort. Patient reports incision is clean, dry and intact.  Regular BMs/Urination? Yes.  Breastfeeding/Formula/Both? Breastfeeding and pumping.  How are you doing emotionally? Patent reports to be doing well emotionally and happy to have baby home!  Do you have any other questions or concerns for us or your provider? No.  Have you scheduled the pediatrician appointment with pediatrician? Baby has his first appointment on .   Do you have a postpartum visit scheduled? Yes.   Date scheduled:  Provider: Yessica HERNANDEZ

## 2024-11-19 ENCOUNTER — DOCUMENTATION (OUTPATIENT)
Dept: CASE MANAGEMENT | Facility: HOSPITAL | Age: 19
End: 2024-11-19

## 2024-11-19 NOTE — PROGRESS NOTES
CM received email from [facility] indicating [facility] accepted services and Rahul Sanchez was assigned as her .

## 2024-11-20 NOTE — OP NOTE
Yardlie Toussaint-Foster, DO  Physician  OB/GYN     L&D Delivery Note     Addendum     Date of Service: 2024 12:45 PM       Brief History  Yury Zaman is a 19 y.o.  at 40w1d admitted for eIOL. A ordaz balloon was placed.  Ordaz balloon fell out and pitocin started along with an epidural . Patient progressed to complete and pushing. During second stage of labor with no descent of caput from -1 station and fetal bradycardia after pushing. The pitocin was stopped and fetal bradycardia persist after every push.  Decision was made to proceed with primary  section for arrest of descent and Cat 2 tracing.     Operative Indications  Primary low transverse  section for Cat 2 tracing and arrest of descent     Attending Surgeon: Dr. Toussaint-Foster  Assistent Surgeon: Dr. Nichols     Operative Findings:  1. Viable male  on 2024 at 1001 with APGARs of 8 and 9 at 1 and 5 minutes. Fetus weighted 7lb 11.5oz.  2. Meconium stained amniotic fluid  3. Normal intact placenta with centrally inserted 3VC.  4. Normal uterus, bilateral tubes and ovaries  5. Filmy adhesions present on the posterior surface of the uterus, the right fallopian tube and ovary      QBL: 1367     Umbilical Cord Venous Blood Gas:       Results from last 7 days   Lab Units 24  0924   PH COV   7.270   PCO2 COV mm HG 40.7   HCO3 COV mmol/L 18.3   BASE EXC COV mmol/L -8.2*   O2 CT CD VB mL/dL 10.1   O2 HGB, VENOUS CORD % 49.9      Umbilical Cord Arterial Blood Gas:         Operative Technique  The patient was taken to the operating room. Epidural  anesthesia was adequately established and Gentamicin and Clindamycin was given for preoperative prophylaxis. The patient was then placed in the dorsal supine position with a left tilt of the hips. The patient was then prepped with chlorhexidine for vaginal prep and chloraprep for abdominal prep.  A fetal pillow was placed in the vagina and draped in  the usual sterile fashion for a Pfannenstiel skin incision.       A time out was performed to confirm correct patient and correct procedure.         A Pfannenstiel incision was made and carried down through the underlying subcutaneous tissue to the fascia using a scalpel. Rectus fascia was then incised. We then proceeded in Yoni-Jaimes fashion. All anatomic layers were well-demarcated. The rectus muscles were  and the peritoneum was identified, entered, and extended longitudinally with blunt dissection.      The Jf-O retractor was inserted and a transverse incision was made in the lower uterine segment using a new surgical blade. The uterine incision was extended cephalad and caudal using blunt dissection. The amniotic sac was entered.     The surgeon's hand was placed into the uterine cavity. The fetal head was identified and elevated through the uterine incision with the assistance of fundal pressure. With gentle traction, the shoulder was delivered, followed by the rest of the fetal body. There was no nuchal cord noted. On delivery the cord was doubly clamped and cut after delayed cord clamping. The infant was then passed off the table to the awaiting  staff. The  was noted to cry spontaneously and moved all extremities. Venous and arterial blood gas, cord blood, and portion of cord was obtained for analysis and routine blood testing. The placenta delivery was then sent to storage. Placenta was noted to be intact with a centrally inserted three-vessel cord.  Oxytocin was administered by IV infusion to enhance uterine contraction. The uterus was exteriorized and cleared of all clots and remaining products of conception.  The uterine incision was examined and noted to have an extension on the right.  The bladder was noted to be intact.     The uterine incision was re approximated using an 0 Vicryl in a running locked fashion. A second horizontal imbricating stitch with the same suture was  applied. The uterine incision was examined and noted to be hemostatic. The posterior cul-de-sac was cleared of all clots and products of conception. The uterus was replaced into the abdomen and the pericolic gutters were cleared of all clots.  The uterine incision was once again reexamined and noted to be hemostatic. The Jf-O retractor was removed from the abdomen. The rectus muscle was re-approximated with 3-0 plain gut suture in a running fashion.  The fascia was re approximated using an 0 Vicryl in a running nonlocked fashion. The subcutaneous tissue was irrigated and cleared of all clots and debris. Good hemostasis was noted with Bovie electrocautery.  The skin incision was closed using 4-0 monocryl. Good hemostasis was noted. Patient tolerated the procedure well. All needle, sponge, and instrument counts were noted to be correct x 2 at the end of the procedure.  Patient was transferred to the recovery room in stable condition.                  Revision History

## 2024-11-21 LAB — PLACENTA IN STORAGE: NORMAL

## 2024-12-03 NOTE — PROGRESS NOTES
Name: Yury Zaman      : 2005      MRN: [id number]  Encounter Provider: MARY Camara  Encounter Date: 2024   Encounter department: [facility]  :  Assessment & Plan  S/P   Can use ibuprofen/Tylenol as needed for pain  Increase ADLs as tolerated       Postpartum exam  1. Contraception: Partner vasectomy  2. Patient verbalizes understanding of support and educational opportunities available at baby and [facility].  Written information provided.  3. Signs and symptoms to report reviewed       Asthma, unspecified asthma severity, unspecified whether complicated, unspecified whether persistent    Orders:    albuterol (ProAir HFA) 90 mcg/act inhaler; Inhale 2 puffs every 6 (six) hours as needed for wheezing    Lactating mother         RTO 3 months for annual exam or sooner as needed    History of Present Illness     HPI  Yury Zaman is a 19 y.o. female who presents for a postpartum visit. She is 3 weeks postpartum following a low cervical transverse  section on 24.   Pregnancy complicated by asthma, tobacco use, gHTN, hemoglobin S trait and pyelectasis of fetus.  The delivery was at 40.1 gestational weeks.  Labor and delivery was complicated by reaching complete and pushing with arrest of descent and fetal bradycardia.  Male infant weighing 7 pounds 11.5 ounces with Apgars of 8/9. Postpartum course has been uncomplicated. Baby's course has been uncomplicated. Baby is feeding by breast. Bleeding staining only. Bowel function is normal. Bladder function is normal. Patient is not sexually active. Contraception method is vasectomy. Postpartum depression screening: negative. NORY no risk.  Patient is requesting refill for albuterol inhaler.  States winter is worse for her asthma.    History obtained from: patient    Review of Systems   Constitutional:  Negative for chills and fever.   Respiratory: Negative.    "  Cardiovascular: Negative.    Genitourinary: Negative.    Neurological:  Negative for headaches.     Medical History Reviewed by provider this encounter:  Allergies  Meds  Problems     .     Objective   /72   Ht 5' 3\" (1.6 m)   Wt 49.4 kg (108 lb 12.8 oz)   LMP 02/06/2024   Breastfeeding Yes   BMI 19.27 kg/m²      Physical Exam  Vitals reviewed.   Constitutional:       Appearance: Normal appearance.   Abdominal:      Comments: Incision clean, dry and intact.  Steri-Strips removed.  No erythema or drainage.  Well-approximated   Neurological:      Mental Status: She is alert and oriented to person, place, and time.   Psychiatric:         Mood and Affect: Mood normal.         Behavior: Behavior normal.           "

## 2024-12-05 ENCOUNTER — POSTPARTUM VISIT (OUTPATIENT)
Dept: OBGYN CLINIC | Facility: MEDICAL CENTER | Age: 19
End: 2024-12-05
Payer: COMMERCIAL

## 2024-12-05 VITALS
SYSTOLIC BLOOD PRESSURE: 104 MMHG | WEIGHT: 108.8 LBS | DIASTOLIC BLOOD PRESSURE: 72 MMHG | HEIGHT: 63 IN | BODY MASS INDEX: 19.28 KG/M2

## 2024-12-05 DIAGNOSIS — J45.909 ASTHMA, UNSPECIFIED ASTHMA SEVERITY, UNSPECIFIED WHETHER COMPLICATED, UNSPECIFIED WHETHER PERSISTENT: ICD-10-CM

## 2024-12-05 DIAGNOSIS — Z98.891 S/P C-SECTION: Primary | ICD-10-CM

## 2024-12-05 PROBLEM — Z91.89 AT RISK FOR SEXUALLY TRANSMITTED DISEASE DUE TO UNPROTECTED SEX: Status: RESOLVED | Noted: 2024-11-12 | Resolved: 2024-12-05

## 2024-12-05 PROBLEM — O99.330 TOBACCO USE AFFECTING PREGNANCY, ANTEPARTUM: Status: RESOLVED | Noted: 2024-11-12 | Resolved: 2024-12-05

## 2024-12-05 PROBLEM — O35.EXX0 PYELECTASIS OF FETUS ON PRENATAL ULTRASOUND: Status: RESOLVED | Noted: 2024-10-24 | Resolved: 2024-12-05

## 2024-12-05 PROBLEM — Z34.90 ENCOUNTER FOR ELECTIVE INDUCTION OF LABOR: Status: RESOLVED | Noted: 2024-11-12 | Resolved: 2024-12-05

## 2024-12-05 PROBLEM — O13.9 GESTATIONAL HYPERTENSION: Status: RESOLVED | Noted: 2024-11-14 | Resolved: 2024-12-05

## 2024-12-05 PROBLEM — O09.73 ENCOUNTER FOR SUPERVISION OF HIGH RISK PREGNANCY DUE TO SOCIAL PROBLEM IN THIRD TRIMESTER, ANTEPARTUM: Status: RESOLVED | Noted: 2024-10-14 | Resolved: 2024-12-05

## 2024-12-05 RX ORDER — ALBUTEROL SULFATE 90 UG/1
2 INHALANT RESPIRATORY (INHALATION) EVERY 6 HOURS PRN
Qty: 8.5 G | Refills: 0 | Status: SHIPPED | OUTPATIENT
Start: 2024-12-05

## 2024-12-05 RX ORDER — ACETAMINOPHEN 325 MG/1
TABLET ORAL
COMMUNITY
Start: 2024-11-18

## 2025-01-16 ENCOUNTER — TELEPHONE (OUTPATIENT)
Dept: FAMILY MEDICINE CLINIC | Facility: CLINIC | Age: 20
End: 2025-01-16

## 2025-01-16 ENCOUNTER — OFFICE VISIT (OUTPATIENT)
Dept: FAMILY MEDICINE CLINIC | Facility: CLINIC | Age: 20
End: 2025-01-16
Payer: COMMERCIAL

## 2025-01-16 VITALS
WEIGHT: 101 LBS | BODY MASS INDEX: 17.89 KG/M2 | SYSTOLIC BLOOD PRESSURE: 110 MMHG | DIASTOLIC BLOOD PRESSURE: 80 MMHG | HEART RATE: 80 BPM | OXYGEN SATURATION: 98 % | HEIGHT: 63 IN

## 2025-01-16 DIAGNOSIS — L29.9 EAR ITCHING: ICD-10-CM

## 2025-01-16 DIAGNOSIS — D57.3 HEMOGLOBIN S TRAIT (HCC): ICD-10-CM

## 2025-01-16 DIAGNOSIS — J45.909 ASTHMA, UNSPECIFIED ASTHMA SEVERITY, UNSPECIFIED WHETHER COMPLICATED, UNSPECIFIED WHETHER PERSISTENT: ICD-10-CM

## 2025-01-16 DIAGNOSIS — Z00.00 ANNUAL PHYSICAL EXAM: Primary | ICD-10-CM

## 2025-01-16 PROCEDURE — 99385 PREV VISIT NEW AGE 18-39: CPT | Performed by: FAMILY MEDICINE

## 2025-01-16 RX ORDER — HYDROCORTISONE 25 MG/G
CREAM TOPICAL 2 TIMES DAILY PRN
Qty: 30 G | Refills: 0 | Status: SHIPPED | OUTPATIENT
Start: 2025-01-16

## 2025-01-16 RX ORDER — ALBUTEROL SULFATE 90 UG/1
2 INHALANT RESPIRATORY (INHALATION) EVERY 6 HOURS PRN
Qty: 8.5 G | Refills: 0 | Status: SHIPPED | OUTPATIENT
Start: 2025-01-16

## 2025-01-16 NOTE — PROGRESS NOTES
Adult Annual Physical  Name: Yury Zaman      : 2005      MRN: [id number]  Encounter Provider: Rodri Young MD  Encounter Date: 2025   Encounter department: [facility]    Assessment & Plan  Annual physical exam         Asthma, unspecified asthma severity, unspecified whether complicated, unspecified whether persistent    Rarely needs albuterol refill sent    Orders:    albuterol (ProAir HFA) 90 mcg/act inhaler; Inhale 2 puffs every 6 (six) hours as needed for wheezing    Ear itching    Feels something stuck in the ear, slighly erythemetous othewise normal, trial of hydrocortisone, if not effective cosnider ENT eval.    Orders:    hydrocortisone 2.5 % cream; Apply topically 2 (two) times a day as needed for rash    Hemoglobin S trait (HCC)    Orders:    CBC and differential; Future    Comprehensive metabolic panel; Future    Immunizations and preventive care screenings were discussed with patient today. Appropriate education was printed on patient's after visit summary.    Counseling:  Alcohol/drug use: discussed moderation in alcohol intake, the recommendations for healthy alcohol use, and avoidance of illicit drug use.  Dental Health: discussed importance of regular tooth brushing, flossing, and dental visits.  Injury prevention: discussed safety/seat belts, safety helmets, smoke detectors, carbon monoxide detectors, and smoking near bedding or upholstery.  Sexual health: discussed sexually transmitted diseases, partner selection, use of condoms, avoidance of unintended pregnancy, and contraceptive alternatives.  Exercise: the importance of regular exercise/physical activity was discussed. Recommend exercise 3-5 times per week for at least 30 minutes.       Depression Screening and Follow-up Plan: Patient was screened for depression during today's encounter. They screened negative with a PHQ-2 score of 0.        History of Present Illness  "    Adult Annual Physical  Review of Systems   Constitutional:  Negative for chills and fever.   HENT:  Negative for ear pain and sore throat.    Eyes:  Negative for pain and visual disturbance.   Respiratory:  Negative for cough and shortness of breath.    Cardiovascular:  Negative for chest pain and palpitations.   Gastrointestinal:  Negative for abdominal pain and vomiting.   Genitourinary:  Negative for dysuria and hematuria.   Musculoskeletal:  Negative for arthralgias and back pain.   Skin:  Negative for color change and rash.   Neurological:  Negative for seizures and syncope.   All other systems reviewed and are negative.        Objective   /80 (BP Location: Right arm, Patient Position: Sitting, Cuff Size: Standard)   Pulse 80   Ht 5' 2.5\" (1.588 m)   Wt 45.8 kg (101 lb)   SpO2 98%   BMI 18.18 kg/m²     Physical Exam  Constitutional:       Appearance: Normal appearance.   HENT:      Right Ear: Tympanic membrane and external ear normal.      Left Ear: Tympanic membrane and external ear normal.      Ears:      Comments: Slight erythema right ear canal  Cardiovascular:      Rate and Rhythm: Normal rate and regular rhythm.      Pulses: Normal pulses.   Pulmonary:      Effort: Pulmonary effort is normal.   Musculoskeletal:         General: Normal range of motion.   Skin:     Comments: Inflamed vein on left forearm   Neurological:      General: No focal deficit present.      Mental Status: She is alert.         "

## 2025-01-16 NOTE — ASSESSMENT & PLAN NOTE
Rarely needs albuterol refill sent    Orders:    albuterol (ProAir HFA) 90 mcg/act inhaler; Inhale 2 puffs every 6 (six) hours as needed for wheezing

## 2025-05-11 ENCOUNTER — HOSPITAL ENCOUNTER (EMERGENCY)
Facility: HOSPITAL | Age: 20
Discharge: HOME | End: 2025-05-11
Attending: EMERGENCY MEDICINE
Payer: COMMERCIAL

## 2025-05-11 VITALS
WEIGHT: 91.27 LBS | TEMPERATURE: 98.3 F | BODY MASS INDEX: 16.17 KG/M2 | OXYGEN SATURATION: 98 % | HEIGHT: 63 IN | DIASTOLIC BLOOD PRESSURE: 64 MMHG | RESPIRATION RATE: 14 BRPM | HEART RATE: 50 BPM | SYSTOLIC BLOOD PRESSURE: 116 MMHG

## 2025-05-11 DIAGNOSIS — N93.9 VAGINAL BLEEDING: ICD-10-CM

## 2025-05-11 DIAGNOSIS — Z32.02 ENCOUNTER FOR PREGNANCY TEST, RESULT NEGATIVE: Primary | ICD-10-CM

## 2025-05-11 LAB
B-HCG SERPL-ACNC: <0.6 MIU/ML (ref 0–5)
BACTERIA UR QL AUTO: ABNORMAL /HPF
BILIRUB UR QL STRIP: NEGATIVE
CLARITY UR: CLEAR
COLOR UR: ABNORMAL
EXT PREGNANCY TEST URINE: NEGATIVE
EXT. CONTROL: NORMAL
GLUCOSE UR STRIP-MCNC: NEGATIVE MG/DL
HGB UR QL STRIP.AUTO: ABNORMAL
KETONES UR STRIP-MCNC: NEGATIVE MG/DL
LEUKOCYTE ESTERASE UR QL STRIP: ABNORMAL
NITRITE UR QL STRIP: NEGATIVE
NON-SQ EPI CELLS URNS QL MICRO: ABNORMAL /HPF
PH UR STRIP.AUTO: 6.5 [PH]
PROT UR STRIP-MCNC: NEGATIVE MG/DL
RBC #/AREA URNS AUTO: ABNORMAL /HPF
SP GR UR STRIP.AUTO: 1.01 (ref 1–1.03)
UROBILINOGEN UR STRIP-ACNC: <2 MG/DL
WBC #/AREA URNS AUTO: ABNORMAL /HPF

## 2025-05-11 PROCEDURE — 99284 EMERGENCY DEPT VISIT MOD MDM: CPT

## 2025-05-11 PROCEDURE — 87086 URINE CULTURE/COLONY COUNT: CPT

## 2025-05-11 PROCEDURE — 36415 COLL VENOUS BLD VENIPUNCTURE: CPT

## 2025-05-11 PROCEDURE — 81025 URINE PREGNANCY TEST: CPT

## 2025-05-11 PROCEDURE — 84702 CHORIONIC GONADOTROPIN TEST: CPT

## 2025-05-11 PROCEDURE — 81001 URINALYSIS AUTO W/SCOPE: CPT

## 2025-05-11 PROCEDURE — 87077 CULTURE AEROBIC IDENTIFY: CPT

## 2025-05-11 NOTE — DISCHARGE INSTRUCTIONS
Your urine and blood pregnancy tests were negative today. Follow up with OB/GYN if you continue to have irregular periods.

## 2025-05-11 NOTE — ED PROVIDER NOTES
Time reflects when diagnosis was documented in both MDM as applicable and the Disposition within this note       Time User Action Codes Description Comment    5/11/2025  9:52 AM Abby Du Add [Z32.02] Encounter for pregnancy test, result negative     5/11/2025  9:52 AM Abby Du Add [N93.9] Vaginal bleeding           ED Disposition       ED Disposition   Discharge    Condition   Stable    Date/Time   Sun May 11, 2025  9:52 AM    Comment   Yury Zaman discharge to home/self care.                   Assessment & Plan       Medical Decision Making  Patient is a 19-year-old female presenting with vaginal bleeding.  Vitals within normal limits on arrival and she is in no acute distress.  LMP 6 weeks ago and she believes she is pregnant.  She has not had a positive pregnancy test at home.  Urine pregnancy was negative in the ED but she is adamant that she is pregnant.  Will obtain quantitative hCG and reevaluate.    Quantitative hCG is also negative-no further workup indicated as she is likely starting her menses.  UA has 2-4 RBCs and 10-20 WBCs however patient is not having urinary symptoms, will send for culture prior to treatment.  Discussed continued follow-up with OB/GYN if she continues to have irregular menses.    I have discussed findings and plan for discharge with the patient/caregiver. Follow up with the appropriate providers including primary care physician was discussed. Return precautions discussed with patient/caregiver as outlined in AVS. Patient/caregiver verbally expressed understanding. Patient stable at time of discharge and ambulated out of the emergency department.     Amount and/or Complexity of Data Reviewed  Labs: ordered.        ED Course as of 05/11/25 1025   Sun May 11, 2025   0839 Blood type O+ per chart review.       Medications - No data to display    ED Risk Strat Scores                    No data recorded        SBIRT 22yo+      Flowsheet Row Most Recent Value    Initial Alcohol Screen: US AUDIT-C     1. How often do you have a drink containing alcohol? 0 Filed at: 2025   2. How many drinks containing alcohol do you have on a typical day you are drinking?  0 Filed at: 2025   3a. Male UNDER 65: How often do you have five or more drinks on one occasion? 0 Filed at: 2025   3b. FEMALE Any Age, or MALE 65+: How often do you have 4 or more drinks on one occassion? 0 Filed at: 2025   Audit-C Score 0 Filed at: 2025   WANDA: How many times in the past year have you...    Used an illegal drug or used a prescription medication for non-medical reasons? Never Filed at: 2025                            History of Present Illness       Chief Complaint   Patient presents with    Vaginal Bleeding - Pregnant     Pt arrives via EMS, is approx 6 weeks pregnant based of LNMP, no US confirming IUP, reports bleedign started yesterday, became heavier last night. +cramping.        Past Medical History:   Diagnosis Date    Asthma     Heart murmur       Past Surgical History:   Procedure Laterality Date    [place]  DELIVERY ONLY N/A 2024    Procedure:  SECTION ();  Surgeon: Yardlie Toussaint-Foster, DO;  Location: [facility];  Service: Obstetrics    SHOULDER SURGERY Right       Family History   Problem Relation Age of Onset    Heart disease Mother     No Known Problems Father     No Known Problems Half-Sister     No Known Problems Half-Sister     No Known Problems Half-Sister     No Known Problems Half-Sister     No Known Problems Half-Brother     No Known Problems Half-Brother     Colon cancer Neg Hx     Breast cancer Neg Hx     Ovarian cancer Neg Hx       Social History     Tobacco Use    Smoking status: Former     Current packs/day: 0.00     Types: Cigarettes     Quit date:      Years since quittin.3     Passive exposure: Past    Smokeless tobacco: Never   Vaping Use    Vaping status: Never Used    Substance Use Topics    Alcohol use: Not Currently     Comment: socially    Drug use: Not Currently     Types: Marijuana     Comment: last used 09/10/22      E-Cigarette/Vaping    E-Cigarette Use Never User       E-Cigarette/Vaping Substances    Nicotine No     THC No     CBD No     Flavoring No     Other No     Unknown No       I have reviewed and agree with the history as documented.     Patient is a 19-year-old  female presenting with vaginal bleeding.  She started with spotting earlier in the day and now she is bleeding with bright red blood.  She is not saturating through a pad or tampon.  She has lower abdominal cramping.  LMP 3/21/2025.  States her periods have been regular since having her child in November.  States she had intercourse and then her period is a few weeks late so she believes she is pregnant.she took 1 pregnancy test at home and it was negative.  She also additionally scheduled an  at the end of the month but canceled it.  No fevers, urinary symptoms, nausea, vomiting, diarrhea, constipation.          Review of Systems   Constitutional:  Negative for chills and fever.   HENT:  Negative for ear pain and sore throat.    Eyes:  Negative for pain and visual disturbance.   Respiratory:  Negative for cough and shortness of breath.    Cardiovascular:  Negative for chest pain and palpitations.   Gastrointestinal:  Negative for abdominal pain, diarrhea, nausea and vomiting.   Genitourinary:  Positive for vaginal bleeding. Negative for dysuria, hematuria and vaginal discharge.   Musculoskeletal:  Negative for arthralgias and back pain.   Skin:  Negative for color change and rash.   Neurological:  Negative for seizures and syncope.   All other systems reviewed and are negative.          Objective       ED Triage Vitals [25 0827]   Temperature Pulse Blood Pressure Respirations SpO2 Patient Position - Orthostatic VS   98.3 °F (36.8 °C) (!) 50 116/64 14 98 % --      Temp Source Heart  Rate Source BP Location FiO2 (%) Pain Score    Oral Monitor -- -- 6      Vitals      Date and Time Temp Pulse SpO2 Resp BP Pain Score FACES Pain Rating User   05/11/25 0827 98.3 °F (36.8 °C) 50 98 % 14 116/64 6 -- EP            Physical Exam  Vitals and nursing note reviewed.   Constitutional:       General: She is not in acute distress.     Appearance: Normal appearance.   HENT:      Head: Normocephalic and atraumatic.      Right Ear: External ear normal.      Left Ear: External ear normal.      Nose: Nose normal.   Eyes:      General: No scleral icterus.        Right eye: No discharge.         Left eye: No discharge.   Cardiovascular:      Rate and Rhythm: Normal rate.      Pulses: Normal pulses.   Pulmonary:      Effort: Pulmonary effort is normal. No respiratory distress.   Abdominal:      Palpations: Abdomen is soft.      Tenderness: There is no abdominal tenderness.   Musculoskeletal:         General: No tenderness, deformity or signs of injury.      Cervical back: Normal range of motion and neck supple.   Skin:     General: Skin is dry.      Coloration: Skin is not jaundiced.      Findings: No erythema or rash.   Neurological:      General: No focal deficit present.      Mental Status: She is alert and oriented to person, place, and time. Mental status is at baseline.      Motor: No weakness.      Gait: Gait normal.   Psychiatric:         Mood and Affect: Mood normal.         Behavior: Behavior normal.         Thought Content: Thought content normal.         Results Reviewed       Procedure Component Value Units Date/Time    Quantitative hCG [[id number]]  (Normal) Collected: 05/11/25 0912    Lab Status: Final result Specimen: Blood from Arm, Right Updated: 05/11/25 0944     HCG, Quant <0.6 mIU/mL     Narrative:       Expected Ranges:    HCG results between 5.0 and 25.0 mIU/mL may be indicative of early pregnancy but should be interpreted in light of the total clinical presentation.    HCG can rise to  detectable levels in ruthie and post menopausal women (0-11.6 mIU/mL).     Approximate               Approximate HCG  Gestation age          Concentration ( mIU/mL)  _____________          ______________________   Weeks                      HCG values  0.2-1                       5-50  1-2                           2-3                         100-5000  3-4                         500-32290  4-5                         1000-56892  5-6                         [id number]  6-8                         [id number]  8-12                        [id number]      Urine Microscopic [[id number]]  (Abnormal) Collected: 05/11/25 0834    Lab Status: Final result Specimen: Urine, Other Updated: 05/11/25 0853     RBC, UA 2-4 /hpf      WBC, UA 10-20 /hpf      Epithelial Cells Occasional /hpf      Bacteria, UA Occasional /hpf      URINE COMMENT --    UA w Reflex to Microscopic w Reflex to Culture [[id number]]  (Abnormal) Collected: 05/11/25 0834    Lab Status: Final result Specimen: Urine, Other Updated: 05/11/25 0844     Color, UA Light Yellow     Clarity, UA Clear     Specific Gravity, UA 1.014     pH, UA 6.5     Leukocytes, UA Moderate     Nitrite, UA Negative     Protein, UA Negative mg/dl      Glucose, UA Negative mg/dl      Ketones, UA Negative mg/dl      Urobilinogen, UA <2.0 mg/dl      Bilirubin, UA Negative     Occult Blood, UA Large     URINE COMMENT --    Urine culture [[id number]] Collected: 05/11/25 0834    Lab Status: In process Specimen: Urine, Other Updated: 05/11/25 0844    POCT pregnancy, urine [[id number]]  (Normal) Collected: 05/11/25 0840    Lab Status: Final result Updated: 05/11/25 0840     EXT Preg Test, Ur Negative     Control Valid            No orders to display       Procedures    ED Medication and Procedure Management   Prior to Admission Medications   Prescriptions Last Dose Informant Patient Reported? Taking?   Prenatal Vit-Fe Fumarate-FA (Prenatal Plus) 27-1 MG TABS   No Yes   Sig: Take 1 tablet by  mouth daily   acetaminophen (TYLENOL) 325 mg tablet   Yes Yes   albuterol (ProAir HFA) 90 mcg/act inhaler Unknown  No No   Sig: Inhale 2 puffs every 6 (six) hours as needed for wheezing   hydrocortisone 2.5 % cream Unknown  No No   Sig: Apply topically 2 (two) times a day as needed for rash      Facility-Administered Medications: None     Discharge Medication List as of 5/11/2025  9:53 AM        CONTINUE these medications which have NOT CHANGED    Details   acetaminophen (TYLENOL) 325 mg tablet Historical Med      Prenatal Vit-Fe Fumarate-FA (Prenatal Plus) 27-1 MG TABS Take 1 tablet by mouth daily, Starting Mon 10/21/2024, Until Fri 7/18/2025, Normal      albuterol (ProAir HFA) 90 mcg/act inhaler Inhale 2 puffs every 6 (six) hours as needed for wheezing, Starting Thu 1/16/2025, Normal      hydrocortisone 2.5 % cream Apply topically 2 (two) times a day as needed for rash, Starting Thu 1/16/2025, Normal           No discharge procedures on file.  ED SEPSIS DOCUMENTATION   Time reflects when diagnosis was documented in both MDM as applicable and the Disposition within this note       Time User Action Codes Description Comment    5/11/2025  9:52 AM Abby Du [Z32.02] Encounter for pregnancy test, result negative     5/11/2025  9:52 AM Abby Du [N93.9] Vaginal bleeding                  Abby Du PA-C  05/11/25 1025

## 2025-05-11 NOTE — Clinical Note
Yury Zaman was seen and treated in our emergency department on 5/11/2025.                Diagnosis:     Yury  may return to work on return date.    She may return on this date: 05/12/2025         If you have any questions or concerns, please don't hesitate to call.      Abby Du PA-C    ______________________________           _______________          _______________  Hospital Representative                              Date                                Time

## 2025-05-13 ENCOUNTER — TELEPHONE (OUTPATIENT)
Dept: OBGYN CLINIC | Facility: CLINIC | Age: 20
End: 2025-05-13

## 2025-05-13 LAB
BACTERIA UR CULT: ABNORMAL
BACTERIA UR CULT: ABNORMAL

## 2025-05-13 NOTE — TELEPHONE ENCOUNTER
Pt called to schedule a NP ER F/U appt. Pt stated she does not want to have a miscarriage. Offered appt for 5/27/25 pt stated that is too far and asked if she can speak with a nurse.

## 2025-05-13 NOTE — TELEPHONE ENCOUNTER
I spoke with pt and she was upset stating that she was not being heard. I tried to explain to her that all her labs and urine UPT were negative. She stated that she is bleeding and that she is losing her baby. I told her that I was trying to help and she needed to calm down take some calming breaths she started screaming into the phone again and the call was disconnected.

## 2025-05-14 ENCOUNTER — RESULTS FOLLOW-UP (OUTPATIENT)
Dept: EMERGENCY DEPT | Facility: HOSPITAL | Age: 20
End: 2025-05-14

## (undated) DEVICE — SUT PLAIN 3-0 CT-1 27 IN 842H

## (undated) DEVICE — TELFA ADHESIVE ISLAND DRESSING: Brand: TELFA

## (undated) DEVICE — SCD SEQUENTIAL COMPRESSION COMFORT SLEEVE MEDIUM KNEE LENGTH: Brand: KENDALL SCD

## (undated) DEVICE — 3M™ STERI-STRIP™ REINFORCED ADHESIVE SKIN CLOSURES, R1546, 1/4 IN X 4 IN (6 MM X 100 MM), 10 STRIPS/ENVELOPE: Brand: 3M™ STERI-STRIP™

## (undated) DEVICE — SUT VICRYL 0 CT 36 IN J958H

## (undated) DEVICE — SKIN MARKER DUAL TIP WITH RULER CAP, FLEXIBLE RULER AND LABELS: Brand: DEVON

## (undated) DEVICE — WOUND RETRACTOR AND PROTECTOR: Brand: ALEXIS O WOUND PROTECTOR-RETRACTOR

## (undated) DEVICE — GLOVE INDICATOR PI UNDERGLOVE SZ 6.5 BLUE

## (undated) DEVICE — CHLORAPREP HI-LITE 10.5ML ORANGE

## (undated) DEVICE — SUT MONOCRYL 4-0 PS-2 27 IN Y426H

## (undated) DEVICE — GLOVE PI ULTRA TOUCH SZ 6

## (undated) DEVICE — KIT,BETHLEHEM C SECT DELIVERY: Brand: CARDINAL HEALTH